# Patient Record
Sex: FEMALE | Race: OTHER
[De-identification: names, ages, dates, MRNs, and addresses within clinical notes are randomized per-mention and may not be internally consistent; named-entity substitution may affect disease eponyms.]

---

## 2020-11-25 ENCOUNTER — HOSPITAL ENCOUNTER (INPATIENT)
Dept: HOSPITAL 15 - ER | Age: 65
LOS: 3 days | Discharge: HOME | DRG: 177 | End: 2020-11-28
Attending: NURSE PRACTITIONER | Admitting: NURSE PRACTITIONER
Payer: COMMERCIAL

## 2020-11-25 VITALS — WEIGHT: 165.13 LBS | BODY MASS INDEX: 32.42 KG/M2 | HEIGHT: 60 IN

## 2020-11-25 VITALS — DIASTOLIC BLOOD PRESSURE: 75 MMHG | SYSTOLIC BLOOD PRESSURE: 122 MMHG

## 2020-11-25 VITALS — SYSTOLIC BLOOD PRESSURE: 122 MMHG | DIASTOLIC BLOOD PRESSURE: 75 MMHG

## 2020-11-25 DIAGNOSIS — I10: ICD-10-CM

## 2020-11-25 DIAGNOSIS — Z79.51: ICD-10-CM

## 2020-11-25 DIAGNOSIS — E66.9: ICD-10-CM

## 2020-11-25 DIAGNOSIS — Z83.3: ICD-10-CM

## 2020-11-25 DIAGNOSIS — E87.6: ICD-10-CM

## 2020-11-25 DIAGNOSIS — Z82.49: ICD-10-CM

## 2020-11-25 DIAGNOSIS — D72.829: ICD-10-CM

## 2020-11-25 DIAGNOSIS — E78.5: ICD-10-CM

## 2020-11-25 DIAGNOSIS — U07.1: Primary | ICD-10-CM

## 2020-11-25 DIAGNOSIS — J12.89: ICD-10-CM

## 2020-11-25 DIAGNOSIS — I31.3: ICD-10-CM

## 2020-11-25 LAB
ALBUMIN SERPL-MCNC: 3.5 G/DL (ref 3.4–5)
ALP SERPL-CCNC: 92 U/L (ref 45–117)
ALT SERPL-CCNC: 36 U/L (ref 13–56)
ANION GAP SERPL CALCULATED.3IONS-SCNC: 9 MMOL/L (ref 5–15)
BILIRUB SERPL-MCNC: 0.6 MG/DL (ref 0.2–1)
BUN SERPL-MCNC: 20 MG/DL (ref 7–18)
BUN/CREAT SERPL: 30.3
CALCIUM SERPL-MCNC: 8.2 MG/DL (ref 8.5–10.1)
CHLORIDE SERPL-SCNC: 101 MMOL/L (ref 98–107)
CO2 SERPL-SCNC: 24 MMOL/L (ref 21–32)
GLUCOSE SERPL-MCNC: 104 MG/DL (ref 74–106)
HCT VFR BLD AUTO: 40.8 % (ref 36–46)
HGB BLD-MCNC: 13.7 G/DL (ref 12.2–16.2)
MAGNESIUM SERPL-MCNC: 2.5 MG/DL (ref 1.6–2.6)
MCH RBC QN AUTO: 29.8 PG (ref 28–32)
MCV RBC AUTO: 88.9 FL (ref 80–100)
NRBC BLD QL AUTO: 0.1 %
POTASSIUM SERPL-SCNC: 3.2 MMOL/L (ref 3.5–5.1)
PROT SERPL-MCNC: 8 G/DL (ref 6.4–8.2)
SODIUM SERPL-SCNC: 134 MMOL/L (ref 136–145)

## 2020-11-25 PROCEDURE — 86141 C-REACTIVE PROTEIN HS: CPT

## 2020-11-25 PROCEDURE — 96365 THER/PROPH/DIAG IV INF INIT: CPT

## 2020-11-25 PROCEDURE — 96372 THER/PROPH/DIAG INJ SC/IM: CPT

## 2020-11-25 PROCEDURE — 84443 ASSAY THYROID STIM HORMONE: CPT

## 2020-11-25 PROCEDURE — 83735 ASSAY OF MAGNESIUM: CPT

## 2020-11-25 PROCEDURE — 74176 CT ABD & PELVIS W/O CONTRAST: CPT

## 2020-11-25 PROCEDURE — 36415 COLL VENOUS BLD VENIPUNCTURE: CPT

## 2020-11-25 PROCEDURE — 83690 ASSAY OF LIPASE: CPT

## 2020-11-25 PROCEDURE — 87426 SARSCOV CORONAVIRUS AG IA: CPT

## 2020-11-25 PROCEDURE — 84484 ASSAY OF TROPONIN QUANT: CPT

## 2020-11-25 PROCEDURE — 85379 FIBRIN DEGRADATION QUANT: CPT

## 2020-11-25 PROCEDURE — 81001 URINALYSIS AUTO W/SCOPE: CPT

## 2020-11-25 PROCEDURE — 87040 BLOOD CULTURE FOR BACTERIA: CPT

## 2020-11-25 PROCEDURE — 82728 ASSAY OF FERRITIN: CPT

## 2020-11-25 PROCEDURE — 80048 BASIC METABOLIC PNL TOTAL CA: CPT

## 2020-11-25 PROCEDURE — 83615 LACTATE (LD) (LDH) ENZYME: CPT

## 2020-11-25 PROCEDURE — 80053 COMPREHEN METABOLIC PANEL: CPT

## 2020-11-25 PROCEDURE — 94640 AIRWAY INHALATION TREATMENT: CPT

## 2020-11-25 PROCEDURE — 96366 THER/PROPH/DIAG IV INF ADDON: CPT

## 2020-11-25 PROCEDURE — 85025 COMPLETE CBC W/AUTO DIFF WBC: CPT

## 2020-11-25 PROCEDURE — 82805 BLOOD GASES W/O2 SATURATION: CPT

## 2020-11-25 PROCEDURE — 36600 WITHDRAWAL OF ARTERIAL BLOOD: CPT

## 2020-11-25 PROCEDURE — 71045 X-RAY EXAM CHEST 1 VIEW: CPT

## 2020-11-25 RX ADMIN — ALBUTEROL SULFATE SCH MCG: 108 AEROSOL, METERED RESPIRATORY (INHALATION) at 22:45

## 2020-11-25 RX ADMIN — BUDESONIDE SCH MCG: 180 AEROSOL, POWDER RESPIRATORY (INHALATION) at 22:48

## 2020-11-25 RX ADMIN — DOXYCYCLINE SCH MLS/HR: 100 INJECTION, POWDER, LYOPHILIZED, FOR SOLUTION INTRAVENOUS at 22:40

## 2020-11-25 NOTE — NUR
IV insertion

IV access obtained, via clean sterile technique by inserting 22 gauge catheter at left wrist 
after 2 attempt(s). IV secured properly. No trauma to site. Patient tolerated procedure 
well.

## 2020-11-25 NOTE — NUR
IV removal - left hand infiltrated and pain noted at site.

IV DC'd with sterile technique, catheter fully intact. Pressure dressing applied to site. 
Patient tolerated procedure well.

## 2020-11-25 NOTE — NUR
Respiratory note:

PT RECIEVED ON RA. PT AWAKE AND ALERT. NO RESP DISTRESS NOTED. SPO2 95%, HR 84, RR 20. BS 
CLEAR/DIMINISHED T/O. NO PRN TX INDICATED AT THIS TIME. PT AWARE TO CALL IF SOB.

-------------------------------------------------------------------------------

Addendum: 11/26/20 at 0128 by GHASSAN ROJAS, RT RT

-------------------------------------------------------------------------------

TX NOT GIVEN, COVID TEST STILL PENDING.

## 2020-11-25 NOTE — NUR
OPENING SHIFT NOTE

Patient Brought to unit from ER

Patient is AOx4 and has no signs of distress or SOB. Patient HOB at 30 degrees and bed 
locked in lowest position. Bed alarm on for safety. POC discussed with patient and patient 
verbally agreed to understanding. Will continue to monitor.

## 2020-11-26 VITALS — SYSTOLIC BLOOD PRESSURE: 116 MMHG | DIASTOLIC BLOOD PRESSURE: 61 MMHG

## 2020-11-26 VITALS — SYSTOLIC BLOOD PRESSURE: 111 MMHG | DIASTOLIC BLOOD PRESSURE: 66 MMHG

## 2020-11-26 VITALS — SYSTOLIC BLOOD PRESSURE: 94 MMHG | DIASTOLIC BLOOD PRESSURE: 61 MMHG

## 2020-11-26 LAB
ALBUMIN SERPL-MCNC: 2.8 G/DL (ref 3.4–5)
ALP SERPL-CCNC: 76 U/L (ref 45–117)
ALT SERPL-CCNC: 34 U/L (ref 13–56)
ANION GAP SERPL CALCULATED.3IONS-SCNC: 6 MMOL/L (ref 5–15)
BILIRUB SERPL-MCNC: 0.5 MG/DL (ref 0.2–1)
BUN SERPL-MCNC: 11 MG/DL (ref 7–18)
BUN/CREAT SERPL: 23.4
CALCIUM SERPL-MCNC: 7.7 MG/DL (ref 8.5–10.1)
CHLORIDE SERPL-SCNC: 108 MMOL/L (ref 98–107)
CO2 SERPL-SCNC: 24 MMOL/L (ref 21–32)
GLUCOSE SERPL-MCNC: 109 MG/DL (ref 74–106)
HCT VFR BLD AUTO: 35.7 % (ref 36–46)
HGB BLD-MCNC: 12.1 G/DL (ref 12.2–16.2)
MCH RBC QN AUTO: 29.8 PG (ref 28–32)
MCV RBC AUTO: 88.3 FL (ref 80–100)
NRBC BLD QL AUTO: 0 %
POTASSIUM SERPL-SCNC: 3.2 MMOL/L (ref 3.5–5.1)
PROT SERPL-MCNC: 6.7 G/DL (ref 6.4–8.2)
SODIUM SERPL-SCNC: 138 MMOL/L (ref 136–145)

## 2020-11-26 RX ADMIN — DOXYCYCLINE SCH MLS/HR: 100 INJECTION, POWDER, LYOPHILIZED, FOR SOLUTION INTRAVENOUS at 09:36

## 2020-11-26 RX ADMIN — BUDESONIDE SCH MCG: 180 AEROSOL, POWDER RESPIRATORY (INHALATION) at 06:49

## 2020-11-26 RX ADMIN — ALBUTEROL SULFATE SCH MCG: 108 AEROSOL, METERED RESPIRATORY (INHALATION) at 06:00

## 2020-11-26 RX ADMIN — ALBUTEROL SULFATE SCH MCG: 108 AEROSOL, METERED RESPIRATORY (INHALATION) at 13:51

## 2020-11-26 RX ADMIN — DOXYCYCLINE SCH MLS/HR: 100 INJECTION, POWDER, LYOPHILIZED, FOR SOLUTION INTRAVENOUS at 21:31

## 2020-11-26 RX ADMIN — Medication SCH MG: at 09:36

## 2020-11-26 RX ADMIN — ALBUTEROL SULFATE SCH MCG: 108 AEROSOL, METERED RESPIRATORY (INHALATION) at 06:49

## 2020-11-26 RX ADMIN — BUDESONIDE SCH MCG: 180 AEROSOL, POWDER RESPIRATORY (INHALATION) at 21:37

## 2020-11-26 RX ADMIN — ENOXAPARIN SODIUM SCH MG: 40 INJECTION SUBCUTANEOUS at 09:37

## 2020-11-26 RX ADMIN — ZINC SULFATE CAP 220 MG (50 MG ELEMENTAL ZN) SCH MG: 220 (50 ZN) CAP at 09:36

## 2020-11-26 RX ADMIN — Medication SCH UNIT: at 09:36

## 2020-11-26 RX ADMIN — ALBUTEROL SULFATE SCH MCG: 108 AEROSOL, METERED RESPIRATORY (INHALATION) at 21:37

## 2020-11-26 NOTE — NUR
opening shift note



Assumed care patient in bed AOx4. No s/s of distress noted at this time, patient denies 
pain. Update given regarding POC and all questions and concerns addressed. Bed in lowest, 
locked position, call light within reach. Will continue care.

## 2020-11-26 NOTE — NUR
OPENING SHIFT NOTE



Assumed care of patient who is alert and oriented, currently on RA with no S/S of distress 
or SOB noted at this time. Patient denies any pain. Full linen changed due to patients 
coffee spilt. Patient is ambulatory without assistance, encouraged to call for assistance as 
needed. POC discussed with patient, all questions answered, patient verbalized 
understanding. Bed is locked, im lowest position, side rails up x2. Call light within reach, 
will continue to monitor PRN/Q1hr.

## 2020-11-26 NOTE — NUR
Spoke with family



Received call from patient's niece Jordyn. Password verified and update on patient 
condition provided at this time.

## 2020-11-27 VITALS — SYSTOLIC BLOOD PRESSURE: 126 MMHG | DIASTOLIC BLOOD PRESSURE: 88 MMHG

## 2020-11-27 VITALS — DIASTOLIC BLOOD PRESSURE: 81 MMHG | SYSTOLIC BLOOD PRESSURE: 134 MMHG

## 2020-11-27 VITALS — DIASTOLIC BLOOD PRESSURE: 86 MMHG | SYSTOLIC BLOOD PRESSURE: 138 MMHG

## 2020-11-27 VITALS — DIASTOLIC BLOOD PRESSURE: 81 MMHG | SYSTOLIC BLOOD PRESSURE: 124 MMHG

## 2020-11-27 LAB
ANION GAP SERPL CALCULATED.3IONS-SCNC: 7 MMOL/L (ref 5–15)
BUN SERPL-MCNC: 14 MG/DL (ref 7–18)
BUN/CREAT SERPL: 25.9
CALCIUM SERPL-MCNC: 8.7 MG/DL (ref 8.5–10.1)
CHLORIDE SERPL-SCNC: 112 MMOL/L (ref 98–107)
CO2 SERPL-SCNC: 23 MMOL/L (ref 21–32)
GLUCOSE SERPL-MCNC: 109 MG/DL (ref 74–106)
POTASSIUM SERPL-SCNC: 3.8 MMOL/L (ref 3.5–5.1)
SODIUM SERPL-SCNC: 142 MMOL/L (ref 136–145)

## 2020-11-27 RX ADMIN — Medication SCH MG: at 09:53

## 2020-11-27 RX ADMIN — DOXYCYCLINE SCH MLS/HR: 100 INJECTION, POWDER, LYOPHILIZED, FOR SOLUTION INTRAVENOUS at 09:53

## 2020-11-27 RX ADMIN — ZINC SULFATE CAP 220 MG (50 MG ELEMENTAL ZN) SCH MG: 220 (50 ZN) CAP at 09:54

## 2020-11-27 RX ADMIN — ALBUTEROL SULFATE SCH MCG: 108 AEROSOL, METERED RESPIRATORY (INHALATION) at 13:21

## 2020-11-27 RX ADMIN — BUDESONIDE SCH MCG: 180 AEROSOL, POWDER RESPIRATORY (INHALATION) at 06:10

## 2020-11-27 RX ADMIN — ALBUTEROL SULFATE SCH MCG: 108 AEROSOL, METERED RESPIRATORY (INHALATION) at 06:10

## 2020-11-27 RX ADMIN — ENOXAPARIN SODIUM SCH MG: 40 INJECTION SUBCUTANEOUS at 09:53

## 2020-11-27 RX ADMIN — Medication SCH UNIT: at 09:53

## 2020-11-27 RX ADMIN — BUDESONIDE SCH MCG: 180 AEROSOL, POWDER RESPIRATORY (INHALATION) at 21:58

## 2020-11-27 RX ADMIN — ALBUTEROL SULFATE SCH MCG: 108 AEROSOL, METERED RESPIRATORY (INHALATION) at 21:58

## 2020-11-27 RX ADMIN — DOXYCYCLINE SCH MLS/HR: 100 INJECTION, POWDER, LYOPHILIZED, FOR SOLUTION INTRAVENOUS at 21:32

## 2020-11-27 NOTE — NUR
OPENING SHIFT NOTE



Assumed care of patient who is alert and oriented, currently on RA with no S/S of distress 
or SOB noted at this time. Patient denies any pain. Patient is ambulatory without 
assistance, encouraged to call for assistance as needed. POC discussed with patient, all 
questions answered, patient verbalized understanding. Bed is locked, im lowest position, 
side rails up x2. Call light within reach, will continue to monitor PRN/Q1hr

## 2020-11-27 NOTE — NUR
Patient  care endorsed 

endorsed care to Danyel rn, pt sitting up in bed eating dinner now. Patient proning throughout 
the day as tolerated and doing IS as ordered. Pt on room air. No acute distress or sob 
noted. Call light within reach.

## 2020-11-27 NOTE — NUR
IV REMOVAL



IV DC'd with clean sterile technique, catheter fully intact. Pressure dressing applied to 
site. Patient tolerated well.

## 2020-11-27 NOTE — NUR
IV INSERTION



IV access obtained, via clean sterile technique by inserting a 22 gauge catheter at left 
forearm after 1 attempt. IV secured properly. No trauma to site. Patient tolerated well.

## 2020-11-28 VITALS — SYSTOLIC BLOOD PRESSURE: 114 MMHG | DIASTOLIC BLOOD PRESSURE: 72 MMHG

## 2020-11-28 VITALS — DIASTOLIC BLOOD PRESSURE: 81 MMHG | SYSTOLIC BLOOD PRESSURE: 139 MMHG

## 2020-11-28 VITALS — SYSTOLIC BLOOD PRESSURE: 123 MMHG | DIASTOLIC BLOOD PRESSURE: 77 MMHG

## 2020-11-28 VITALS — DIASTOLIC BLOOD PRESSURE: 78 MMHG | SYSTOLIC BLOOD PRESSURE: 136 MMHG

## 2020-11-28 RX ADMIN — Medication SCH UNIT: at 09:50

## 2020-11-28 RX ADMIN — ENOXAPARIN SODIUM SCH MG: 40 INJECTION SUBCUTANEOUS at 09:50

## 2020-11-28 RX ADMIN — DOXYCYCLINE SCH MLS/HR: 100 INJECTION, POWDER, LYOPHILIZED, FOR SOLUTION INTRAVENOUS at 09:50

## 2020-11-28 RX ADMIN — ALBUTEROL SULFATE SCH MCG: 108 AEROSOL, METERED RESPIRATORY (INHALATION) at 06:06

## 2020-11-28 RX ADMIN — ZINC SULFATE CAP 220 MG (50 MG ELEMENTAL ZN) SCH MG: 220 (50 ZN) CAP at 09:50

## 2020-11-28 RX ADMIN — Medication SCH MG: at 09:50

## 2020-11-28 RX ADMIN — BUDESONIDE SCH MCG: 180 AEROSOL, POWDER RESPIRATORY (INHALATION) at 06:06

## 2020-11-28 RX ADMIN — ALBUTEROL SULFATE SCH MCG: 108 AEROSOL, METERED RESPIRATORY (INHALATION) at 13:23

## 2020-11-28 NOTE — NUR
Discharge instructions given as ordered. Encourage to follow up with PMD as instructed. All 
questions and concerns addressed. Patient verbalized understanding.  Medication 
reconciliation form completed and copy given to patient. New prescription given to patient 
and educated on use, side effects, signs and symptoms and contraindications. IV removed with 
catheter intact, pressure dressing applied.  Telemetry unit returned to ICU. Patient taken 
to vehicle via wheelchair with all personal belongings, accompanied by staff and patient's 
niece Jordyn picked her up. No distress noted at time of departure or sob.

## 2020-11-28 NOTE — NUR
Nutrition Assessment Notes



Please refer to link for full assessment notes.



Est Energy needs: [id number] kcals (17-20 kcal/kgBW)

Est Protein needs: 75-83 gms/day (1.0-1.1 gm/kgBW)



Will continue to monitor and reassess prn.

-------------------------------------------------------------------------------

Addendum: 11/28/20 at 1238 by Brittny Barreto RD

-------------------------------------------------------------------------------

Amended: Links added.

## 2021-03-29 NOTE — NUR
Respiratory note:

MEDICATION NOT AVAILABLE AT THIS TIME. PHARMACY NOTIFIED. NO DISTRESS NOTED. HR 62, RR 18, 
POX 94% ON RA. Detail Level: Detailed

## 2023-02-11 ENCOUNTER — APPOINTMENT (OUTPATIENT)
Dept: RADIOLOGY | Facility: MEDICAL CENTER | Age: 68
DRG: 813 | End: 2023-02-11
Attending: EMERGENCY MEDICINE
Payer: COMMERCIAL

## 2023-02-11 ENCOUNTER — HOSPITAL ENCOUNTER (INPATIENT)
Facility: MEDICAL CENTER | Age: 68
LOS: 6 days | DRG: 813 | End: 2023-02-17
Attending: EMERGENCY MEDICINE | Admitting: STUDENT IN AN ORGANIZED HEALTH CARE EDUCATION/TRAINING PROGRAM
Payer: COMMERCIAL

## 2023-02-11 DIAGNOSIS — D69.6 SEVERE THROMBOCYTOPENIA (HCC): ICD-10-CM

## 2023-02-11 DIAGNOSIS — T14.8XXA BRUISING: ICD-10-CM

## 2023-02-11 DIAGNOSIS — R51.9 ACUTE NONINTRACTABLE HEADACHE, UNSPECIFIED HEADACHE TYPE: ICD-10-CM

## 2023-02-11 DIAGNOSIS — R04.0 EPISTAXIS: ICD-10-CM

## 2023-02-11 DIAGNOSIS — D69.6 THROMBOCYTOPENIA (HCC): ICD-10-CM

## 2023-02-11 PROBLEM — E87.6 HYPOKALEMIA: Status: ACTIVE | Noted: 2023-02-11

## 2023-02-11 PROBLEM — I10 HYPERTENSION: Status: ACTIVE | Noted: 2023-02-11

## 2023-02-11 PROBLEM — E78.5 HYPERLIPIDEMIA: Status: ACTIVE | Noted: 2023-02-11

## 2023-02-11 LAB
ABO + RH BLD: NORMAL
ABO GROUP BLD: ABNORMAL
ALBUMIN SERPL BCP-MCNC: 3.8 G/DL (ref 3.2–4.9)
ALBUMIN/GLOB SERPL: 1.1 G/DL
ALP SERPL-CCNC: 126 U/L (ref 30–99)
ALT SERPL-CCNC: 72 U/L (ref 2–50)
ANION GAP SERPL CALC-SCNC: 10 MMOL/L (ref 7–16)
ANISOCYTOSIS BLD QL SMEAR: ABNORMAL
APTT PPP: 27 SEC (ref 24.7–36)
AST SERPL-CCNC: 39 U/L (ref 12–45)
BARCODED ABORH UBTYP: 6200
BARCODED PRD CODE UBPRD: NORMAL
BARCODED UNIT NUM UBUNT: NORMAL
BASOPHILS # BLD AUTO: 0.4 % (ref 0–1.8)
BASOPHILS # BLD: 0.03 K/UL (ref 0–0.12)
BILIRUB SERPL-MCNC: 0.4 MG/DL (ref 0.1–1.5)
BLD GP AB INVEST PLASRBC-IMP: ABNORMAL
BLD GP AB INVEST PLASRBC-IMP: ABNORMAL
BLD GP AB SCN SERPL QL: ABNORMAL
BUN SERPL-MCNC: 11 MG/DL (ref 8–22)
CALCIUM ALBUM COR SERPL-MCNC: 9.5 MG/DL (ref 8.5–10.5)
CALCIUM SERPL-MCNC: 9.3 MG/DL (ref 8.5–10.5)
CHLORIDE SERPL-SCNC: 104 MMOL/L (ref 96–112)
CO2 SERPL-SCNC: 26 MMOL/L (ref 20–33)
COMMENT 1642: NORMAL
COMPONENT P 8504P: NORMAL
CREAT SERPL-MCNC: 0.42 MG/DL (ref 0.5–1.4)
DAT C3D-SP REAG RBC QL: ABNORMAL
DAT IGG-SP REAG RBC QL: ABNORMAL
EOSINOPHIL # BLD AUTO: 0.03 K/UL (ref 0–0.51)
EOSINOPHIL NFR BLD: 0.4 % (ref 0–6.9)
ERYTHROCYTE [DISTWIDTH] IN BLOOD BY AUTOMATED COUNT: 47.4 FL (ref 35.9–50)
GFR SERPLBLD CREATININE-BSD FMLA CKD-EPI: 107 ML/MIN/1.73 M 2
GLOBULIN SER CALC-MCNC: 3.6 G/DL (ref 1.9–3.5)
GLUCOSE SERPL-MCNC: 108 MG/DL (ref 65–99)
HAV IGM SERPL QL IA: NORMAL
HBV CORE IGM SER QL: NORMAL
HBV SURFACE AG SER QL: NORMAL
HCT VFR BLD AUTO: 34.5 % (ref 37–47)
HCV AB SER QL: NORMAL
HGB BLD-MCNC: 11.8 G/DL (ref 12–16)
HIV 1+2 AB+HIV1 P24 AG SERPL QL IA: NORMAL
IMM GRANULOCYTES # BLD AUTO: 0.03 K/UL (ref 0–0.11)
IMM GRANULOCYTES NFR BLD AUTO: 0.4 % (ref 0–0.9)
INR PPP: 0.99 (ref 0.87–1.13)
LDH SERPL L TO P-CCNC: 238 U/L (ref 107–266)
LYMPHOCYTES # BLD AUTO: 1.67 K/UL (ref 1–4.8)
LYMPHOCYTES NFR BLD: 20.6 % (ref 22–41)
MACROCYTES BLD QL SMEAR: ABNORMAL
MCH RBC QN AUTO: 30.6 PG (ref 27–33)
MCHC RBC AUTO-ENTMCNC: 34.2 G/DL (ref 33.6–35)
MCV RBC AUTO: 89.6 FL (ref 81.4–97.8)
MICROCYTES BLD QL SMEAR: ABNORMAL
MONOCYTES # BLD AUTO: 0.73 K/UL (ref 0–0.85)
MONOCYTES NFR BLD AUTO: 9 % (ref 0–13.4)
MORPHOLOGY BLD-IMP: NORMAL
NEUTROPHILS # BLD AUTO: 5.62 K/UL (ref 2–7.15)
NEUTROPHILS NFR BLD: 69.2 % (ref 44–72)
NRBC # BLD AUTO: 0 K/UL
NRBC BLD-RTO: 0 /100 WBC
PLATELET # BLD AUTO: 11 K/UL (ref 164–446)
PLATELET BLD QL SMEAR: NORMAL
PLATELETS.RETICULATED NFR BLD AUTO: 33.8 K/UL (ref 0.6–13.1)
POTASSIUM SERPL-SCNC: 3.1 MMOL/L (ref 3.6–5.5)
PRODUCT TYPE UPROD: NORMAL
PROT SERPL-MCNC: 7.4 G/DL (ref 6–8.2)
PROTHROMBIN TIME: 13 SEC (ref 12–14.6)
RBC # BLD AUTO: 3.85 M/UL (ref 4.2–5.4)
RBC BLD AUTO: PRESENT
RH BLD: ABNORMAL
SODIUM SERPL-SCNC: 140 MMOL/L (ref 135–145)
UNIT STATUS USTAT: NORMAL
VIT B12 SERPL-MCNC: 565 PG/ML (ref 211–911)
WBC # BLD AUTO: 8.1 K/UL (ref 4.8–10.8)

## 2023-02-11 PROCEDURE — 30233R1 TRANSFUSION OF NONAUTOLOGOUS PLATELETS INTO PERIPHERAL VEIN, PERCUTANEOUS APPROACH: ICD-10-PCS

## 2023-02-11 PROCEDURE — 85730 THROMBOPLASTIN TIME PARTIAL: CPT

## 2023-02-11 PROCEDURE — 86870 RBC ANTIBODY IDENTIFICATION: CPT

## 2023-02-11 PROCEDURE — 99223 1ST HOSP IP/OBS HIGH 75: CPT | Mod: GC | Performed by: STUDENT IN AN ORGANIZED HEALTH CARE EDUCATION/TRAINING PROGRAM

## 2023-02-11 PROCEDURE — 80053 COMPREHEN METABOLIC PANEL: CPT

## 2023-02-11 PROCEDURE — 770020 HCHG ROOM/CARE - TELE (206)

## 2023-02-11 PROCEDURE — P9034 PLATELETS, PHERESIS: HCPCS

## 2023-02-11 PROCEDURE — 85025 COMPLETE CBC W/AUTO DIFF WBC: CPT

## 2023-02-11 PROCEDURE — 86901 BLOOD TYPING SEROLOGIC RH(D): CPT

## 2023-02-11 PROCEDURE — 36415 COLL VENOUS BLD VENIPUNCTURE: CPT

## 2023-02-11 PROCEDURE — A9270 NON-COVERED ITEM OR SERVICE: HCPCS

## 2023-02-11 PROCEDURE — 700102 HCHG RX REV CODE 250 W/ 637 OVERRIDE(OP)

## 2023-02-11 PROCEDURE — 700102 HCHG RX REV CODE 250 W/ 637 OVERRIDE(OP): Performed by: STUDENT IN AN ORGANIZED HEALTH CARE EDUCATION/TRAINING PROGRAM

## 2023-02-11 PROCEDURE — 86880 COOMBS TEST DIRECT: CPT

## 2023-02-11 PROCEDURE — 82607 VITAMIN B-12: CPT

## 2023-02-11 PROCEDURE — 85055 RETICULATED PLATELET ASSAY: CPT

## 2023-02-11 PROCEDURE — 83615 LACTATE (LD) (LDH) ENZYME: CPT

## 2023-02-11 PROCEDURE — 700111 HCHG RX REV CODE 636 W/ 250 OVERRIDE (IP): Mod: JG

## 2023-02-11 PROCEDURE — 86850 RBC ANTIBODY SCREEN: CPT

## 2023-02-11 PROCEDURE — 70450 CT HEAD/BRAIN W/O DYE: CPT

## 2023-02-11 PROCEDURE — 86900 BLOOD TYPING SEROLOGIC ABO: CPT

## 2023-02-11 PROCEDURE — A9270 NON-COVERED ITEM OR SERVICE: HCPCS | Performed by: STUDENT IN AN ORGANIZED HEALTH CARE EDUCATION/TRAINING PROGRAM

## 2023-02-11 PROCEDURE — 80074 ACUTE HEPATITIS PANEL: CPT

## 2023-02-11 PROCEDURE — 85610 PROTHROMBIN TIME: CPT

## 2023-02-11 PROCEDURE — 86038 ANTINUCLEAR ANTIBODIES: CPT

## 2023-02-11 PROCEDURE — 86905 BLOOD TYPING RBC ANTIGENS: CPT | Mod: 91

## 2023-02-11 PROCEDURE — 99285 EMERGENCY DEPT VISIT HI MDM: CPT

## 2023-02-11 PROCEDURE — 36430 TRANSFUSION BLD/BLD COMPNT: CPT

## 2023-02-11 PROCEDURE — 87389 HIV-1 AG W/HIV-1&-2 AB AG IA: CPT

## 2023-02-11 RX ORDER — POTASSIUM CHLORIDE 1500 MG/1
40 TABLET, EXTENDED RELEASE ORAL ONCE
Status: COMPLETED | OUTPATIENT
Start: 2023-02-11 | End: 2023-02-11

## 2023-02-11 RX ORDER — POLYETHYLENE GLYCOL 3350 17 G/17G
1 POWDER, FOR SOLUTION ORAL
Status: DISCONTINUED | OUTPATIENT
Start: 2023-02-11 | End: 2023-02-12

## 2023-02-11 RX ORDER — HYDROCHLOROTHIAZIDE 25 MG/1
25 TABLET ORAL DAILY
Status: ON HOLD | COMMUNITY
End: 2023-02-17

## 2023-02-11 RX ORDER — BISACODYL 10 MG
10 SUPPOSITORY, RECTAL RECTAL
Status: DISCONTINUED | OUTPATIENT
Start: 2023-02-11 | End: 2023-02-12

## 2023-02-11 RX ORDER — ATORVASTATIN CALCIUM 20 MG/1
20 TABLET, FILM COATED ORAL NIGHTLY
COMMUNITY

## 2023-02-11 RX ORDER — AMOXICILLIN 250 MG
2 CAPSULE ORAL 2 TIMES DAILY
Status: DISCONTINUED | OUTPATIENT
Start: 2023-02-11 | End: 2023-02-12

## 2023-02-11 RX ADMIN — SENNOSIDES AND DOCUSATE SODIUM 2 TABLET: 50; 8.6 TABLET ORAL at 21:57

## 2023-02-11 RX ADMIN — POTASSIUM CHLORIDE 40 MEQ: 1500 TABLET, EXTENDED RELEASE ORAL at 23:49

## 2023-02-11 RX ADMIN — IMMUNE GLOBULIN INFUSION (HUMAN) 45 G: 100 INJECTION, SOLUTION INTRAVENOUS; SUBCUTANEOUS at 21:51

## 2023-02-11 ASSESSMENT — ENCOUNTER SYMPTOMS
BLURRED VISION: 0
NAUSEA: 0
WHEEZING: 0
VOMITING: 0
CHILLS: 0
FEVER: 0
SHORTNESS OF BREATH: 0
PALPITATIONS: 0
DOUBLE VISION: 0
HEADACHES: 1

## 2023-02-11 ASSESSMENT — PATIENT HEALTH QUESTIONNAIRE - PHQ9
8. MOVING OR SPEAKING SO SLOWLY THAT OTHER PEOPLE COULD HAVE NOTICED. OR THE OPPOSITE, BEING SO FIGETY OR RESTLESS THAT YOU HAVE BEEN MOVING AROUND A LOT MORE THAN USUAL: NOT AT ALL
4. FEELING TIRED OR HAVING LITTLE ENERGY: NOT AT ALL
6. FEELING BAD ABOUT YOURSELF - OR THAT YOU ARE A FAILURE OR HAVE LET YOURSELF OR YOUR FAMILY DOWN: SEVERAL DAYS
2. FEELING DOWN, DEPRESSED, IRRITABLE, OR HOPELESS: MORE THAN HALF THE DAYS
5. POOR APPETITE OR OVEREATING: NOT AT ALL
1. LITTLE INTEREST OR PLEASURE IN DOING THINGS: MORE THAN HALF THE DAYS
9. THOUGHTS THAT YOU WOULD BE BETTER OFF DEAD, OR OF HURTING YOURSELF: SEVERAL DAYS
SUM OF ALL RESPONSES TO PHQ QUESTIONS 1-9: 7
7. TROUBLE CONCENTRATING ON THINGS, SUCH AS READING THE NEWSPAPER OR WATCHING TELEVISION: NOT AT ALL
SUM OF ALL RESPONSES TO PHQ9 QUESTIONS 1 AND 2: 4
3. TROUBLE FALLING OR STAYING ASLEEP OR SLEEPING TOO MUCH: SEVERAL DAYS

## 2023-02-11 ASSESSMENT — FIBROSIS 4 INDEX: FIB4 SCORE: 28

## 2023-02-11 ASSESSMENT — LIFESTYLE VARIABLES
EVER HAD A DRINK FIRST THING IN THE MORNING TO STEADY YOUR NERVES TO GET RID OF A HANGOVER: NO
CONSUMPTION TOTAL: NEGATIVE
EVER FELT BAD OR GUILTY ABOUT YOUR DRINKING: NO
HAVE PEOPLE ANNOYED YOU BY CRITICIZING YOUR DRINKING: NO
TOTAL SCORE: 0
HAVE YOU EVER FELT YOU SHOULD CUT DOWN ON YOUR DRINKING: NO
ALCOHOL_USE: NO
AVERAGE NUMBER OF DAYS PER WEEK YOU HAVE A DRINK CONTAINING ALCOHOL: 0
HOW MANY TIMES IN THE PAST YEAR HAVE YOU HAD 5 OR MORE DRINKS IN A DAY: 0
ON A TYPICAL DAY WHEN YOU DRINK ALCOHOL HOW MANY DRINKS DO YOU HAVE: 0

## 2023-02-11 ASSESSMENT — PAIN DESCRIPTION - PAIN TYPE
TYPE: ACUTE PAIN
TYPE: ACUTE PAIN

## 2023-02-11 NOTE — ED TRIAGE NOTES
"Chief Complaint   Patient presents with    Abnormal Labs     Sent for low platelets \"12\"     Pt diagnosed with ITP at [facility]. Reports that she normally goes to [facility], but no longer wants to go there so she came here today.  BP (!) 131/94   Pulse (!) 101   Temp 36.6 °C (97.8 °F) (Temporal)   Resp 16   Wt 76.1 kg (167 lb 12.3 oz)   SpO2 96%   Pt informed of wait times. Educated on triage process.  Asked to return to triage RN for any new or worsening of symptoms. Thanked for patience.      "

## 2023-02-11 NOTE — ED PROVIDER NOTES
"ED Provider Note    CHIEF COMPLAINT  Chief Complaint   Patient presents with    Abnormal Labs     Sent for low platelets \"12\"       EXTERNAL RECORDS REVIEWED  No external records for review available.    HPI/ROS  LIMITATION TO HISTORY   Select: : None  OUTSIDE HISTORIAN(S):  Family spouse at bedside provides additional history.    Virgen Boone is a 67 y.o. female who presents to the emergency department for evaluation of low platelets.  The patient has a history of thrombocytopenia.  She was recently in [facility] was records were not available.  Sounds like it was an idiopathic thrombocytopenia.  She was discharged when her platelets improved.  Follow-up she had low platelets and she was told to come to the hospital.    She has been bruising on her arms and in some places across her body.  She has some episodes of epistaxis.  She also is a mild headache that started yesterday.  No falls or trauma no other acute concerns or complaints of pain.  No blood thinners.  No other chronic medical problems.    PAST MEDICAL HISTORY   has a past medical history of Hypertension.    SURGICAL HISTORY  patient denies any surgical history    FAMILY HISTORY  History reviewed. No pertinent family history.    SOCIAL HISTORY  Social History     Tobacco Use    Smoking status: Never    Smokeless tobacco: Never   Vaping Use    Vaping Use: Never used   Substance and Sexual Activity    Alcohol use: Not Currently    Drug use: Not Currently    Sexual activity: Not on file       CURRENT MEDICATIONS  Home Medications       Reviewed by Haven Olguin R.N. (Registered Nurse) on 02/11/23 at 1511  Med List Status: Partial     Medication Last Dose Status        Patient Yuniel Taking any Medications                           ALLERGIES  No Known Allergies    PHYSICAL EXAM  VITAL SIGNS: BP (!) 131/94   Pulse (!) 101   Temp 36.6 °C (97.8 °F) (Temporal)   Resp 16   Wt 76.1 kg (167 lb 12.3 oz)   SpO2 96%    Constitutional: " Well developed, Well nourished, No acute distress, Non-toxic appearance.   HENT: Normocephalic, Atraumatic,  Eyes: PERRL, EOMI, Conjunctiva normal, No discharge.   Neck: Normal range of motion, No tenderness, Supple, No stridor.   Cardiovascular: Normal heart rate, Normal rhythm, No murmurs, No rubs, No gallops.   Thorax & Lungs: Normal breath sounds, No respiratory distress, No wheezing,  Abdomen: Bowel sounds normal, Soft, No tenderness  Skin: Warm, Dry, No erythema, No rash.  Bruises on her right upper extremity.  Musculoskeletal: Good range of motion in all major joints. No edema. No tenderness to palpation or major deformities noted.   Neurologic: Alert, No focal deficits noted.   Psychiatric: Affect normal      DIAGNOSTIC STUDIES / PROCEDURES    Results for orders placed or performed during the hospital encounter of 02/11/23   CBC WITH DIFFERENTIAL   Result Value Ref Range    WBC 8.1 4.8 - 10.8 K/uL    RBC 3.85 (L) 4.20 - 5.40 M/uL    Hemoglobin 11.8 (L) 12.0 - 16.0 g/dL    Hematocrit 34.5 (L) 37.0 - 47.0 %    MCV 89.6 81.4 - 97.8 fL    MCH 30.6 27.0 - 33.0 pg    MCHC 34.2 33.6 - 35.0 g/dL    RDW 47.4 35.9 - 50.0 fL    Platelet Count 11 (LL) 164 - 446 K/uL    Neutrophils-Polys 69.20 44.00 - 72.00 %    Lymphocytes 20.60 (L) 22.00 - 41.00 %    Monocytes 9.00 0.00 - 13.40 %    Eosinophils 0.40 0.00 - 6.90 %    Basophils 0.40 0.00 - 1.80 %    Immature Granulocytes 0.40 0.00 - 0.90 %    Nucleated RBC 0.00 /100 WBC    Neutrophils (Absolute) 5.62 2.00 - 7.15 K/uL    Lymphs (Absolute) 1.67 1.00 - 4.80 K/uL    Monos (Absolute) 0.73 0.00 - 0.85 K/uL    Eos (Absolute) 0.03 0.00 - 0.51 K/uL    Baso (Absolute) 0.03 0.00 - 0.12 K/uL    Immature Granulocytes (abs) 0.03 0.00 - 0.11 K/uL    NRBC (Absolute) 0.00 K/uL    Anisocytosis 1+     Macrocytosis 1+     Microcytosis 1+    COMP METABOLIC PANEL   Result Value Ref Range    Sodium 140 135 - 145 mmol/L    Potassium 3.1 (L) 3.6 - 5.5 mmol/L    Chloride 104 96 - 112 mmol/L     Co2 26 20 - 33 mmol/L    Anion Gap 10.0 7.0 - 16.0    Glucose 108 (H) 65 - 99 mg/dL    Bun 11 8 - 22 mg/dL    Creatinine 0.42 (L) 0.50 - 1.40 mg/dL    Calcium 9.3 8.5 - 10.5 mg/dL    AST(SGOT) 39 12 - 45 U/L    ALT(SGPT) 72 (H) 2 - 50 U/L    Alkaline Phosphatase 126 (H) 30 - 99 U/L    Total Bilirubin 0.4 0.1 - 1.5 mg/dL    Albumin 3.8 3.2 - 4.9 g/dL    Total Protein 7.4 6.0 - 8.2 g/dL    Globulin 3.6 (H) 1.9 - 3.5 g/dL    A-G Ratio 1.1 g/dL   APTT   Result Value Ref Range    APTT 27.0 24.7 - 36.0 sec   PROTHROMBIN TIME (INR)   Result Value Ref Range    PT 13.0 12.0 - 14.6 sec    INR 0.99 0.87 - 1.13   ESTIMATED GFR   Result Value Ref Range    GFR (CKD-EPI) 107 >60 mL/min/1.73 m 2   CORRECTED CALCIUM   Result Value Ref Range    Correct Calcium 9.5 8.5 - 10.5 mg/dL   PERIPHERAL SMEAR REVIEW   Result Value Ref Range    Peripheral Smear Review see below    PLATELET ESTIMATE   Result Value Ref Range    Plt Estimation Marked Decrease    MORPHOLOGY   Result Value Ref Range    RBC Morphology Present    IMMATURE PLT FRACTION   Result Value Ref Range    Imm. Plt Fraction 33.8 (H) 0.6 - 13.1 K/uL   DIFFERENTIAL COMMENT   Result Value Ref Range    Comments-Diff see below         RADIOLOGY  I have independently interpreted the diagnostic imaging associated with this visit and am waiting the final reading from the radiologist.   My preliminary interpretation is a follows: No intracranial hemorrhage  Radiologist interpretation:     CT-HEAD W/O   Final Result      Head CT without contrast within normal limits. No evidence of acute cerebral infarction, hemorrhage or mass lesion.               COURSE & MEDICAL DECISION MAKING    ED Observation Status?= No the patient will require hospitalization for acutely abnormal labs    INITIAL ASSESSMENT, COURSE AND PLAN  Care Narrative: The patient has ITP and she has epistaxis and bruising also has a mild headache.  Although she has ITP she was recently in the hospital and treated with IVIG  and steroids and other medications and a platelet transfusion.  Platelets trended up and she was discharged home and had a given decrease in her platelet count.    Have elected to repeat her labs her platelets are again 11,000.  I spoke with the hematologist    Dr lizama, and he recommended 1 g of IVIG Solu-Medrol 80 mg platelets and a head CT.  This will be initiated.    The patient has no other signs of life-threatening bleeding at this time.  She well require hospitalization for life-threatening low platelets.    I have [facility] and I have communicated this plan to them.    The patient quires hospice and further work-up and treatment.    Medications recommended by hematology again are IVIG 1 g Solu-Medrol 80 mg and 1 unit of platelets.  CT is pending.      Patient has acutely low platelets which are so low that this is acutely life-threatening for hemorrhage she required hospitalization and emergent treatment.  Although she has mild epistaxis she is not bleeding at this time she is not requiring intervention.      ADDITIONAL PROBLEM LIST  Thrombocytopenia      DISPOSITION AND DISCUSSIONS  I have discussed management of the patient with the following physicians and TEZ's: Hematology as above and [facility] Residents    Discussion of management with other QHP or appropriate source(s): None             FINAL DIAGNOSIS  1. Thrombocytopenia (HCC)    2. Epistaxis    3. Bruising    4. Acute nonintractable headache, unspecified headache type           Electronically signed by: Morgan Rivera M.D., 2/11/2023 3:56 PM

## 2023-02-12 ENCOUNTER — APPOINTMENT (OUTPATIENT)
Dept: RADIOLOGY | Facility: MEDICAL CENTER | Age: 68
DRG: 813 | End: 2023-02-12
Attending: INTERNAL MEDICINE
Payer: COMMERCIAL

## 2023-02-12 PROBLEM — D69.6 DISORDER INVOLVING THROMBOCYTOPENIA (HCC): Status: ACTIVE | Noted: 2023-01-22

## 2023-02-12 PROBLEM — R04.0 EPISTAXIS: Status: ACTIVE | Noted: 2023-01-22

## 2023-02-12 LAB
ALBUMIN SERPL BCP-MCNC: 3.6 G/DL (ref 3.2–4.9)
ALBUMIN/GLOB SERPL: 0.8 G/DL
ALP SERPL-CCNC: 113 U/L (ref 30–99)
ALT SERPL-CCNC: 66 U/L (ref 2–50)
ANION GAP SERPL CALC-SCNC: 10 MMOL/L (ref 7–16)
AST SERPL-CCNC: 36 U/L (ref 12–45)
BASOPHILS # BLD AUTO: 0.2 % (ref 0–1.8)
BASOPHILS # BLD: 0.02 K/UL (ref 0–0.12)
BILIRUB SERPL-MCNC: 0.6 MG/DL (ref 0.1–1.5)
BUN SERPL-MCNC: 13 MG/DL (ref 8–22)
CALCIUM ALBUM COR SERPL-MCNC: 9.8 MG/DL (ref 8.5–10.5)
CALCIUM SERPL-MCNC: 9.5 MG/DL (ref 8.5–10.5)
CHLORIDE SERPL-SCNC: 102 MMOL/L (ref 96–112)
CO2 SERPL-SCNC: 24 MMOL/L (ref 20–33)
CREAT SERPL-MCNC: 0.53 MG/DL (ref 0.5–1.4)
EOSINOPHIL # BLD AUTO: 0.01 K/UL (ref 0–0.51)
EOSINOPHIL NFR BLD: 0.1 % (ref 0–6.9)
ERYTHROCYTE [DISTWIDTH] IN BLOOD BY AUTOMATED COUNT: 48.9 FL (ref 35.9–50)
GFR SERPLBLD CREATININE-BSD FMLA CKD-EPI: 101 ML/MIN/1.73 M 2
GLOBULIN SER CALC-MCNC: 4.4 G/DL (ref 1.9–3.5)
GLUCOSE SERPL-MCNC: 204 MG/DL (ref 65–99)
HCT VFR BLD AUTO: 33.7 % (ref 37–47)
HGB BLD-MCNC: 11.1 G/DL (ref 12–16)
IMM GRANULOCYTES # BLD AUTO: 0.06 K/UL (ref 0–0.11)
IMM GRANULOCYTES NFR BLD AUTO: 0.6 % (ref 0–0.9)
LYMPHOCYTES # BLD AUTO: 0.84 K/UL (ref 1–4.8)
LYMPHOCYTES NFR BLD: 7.7 % (ref 22–41)
MCH RBC QN AUTO: 30.5 PG (ref 27–33)
MCHC RBC AUTO-ENTMCNC: 32.9 G/DL (ref 33.6–35)
MCV RBC AUTO: 92.6 FL (ref 81.4–97.8)
MONOCYTES # BLD AUTO: 0.13 K/UL (ref 0–0.85)
MONOCYTES NFR BLD AUTO: 1.2 % (ref 0–13.4)
NEUTROPHILS # BLD AUTO: 9.84 K/UL (ref 2–7.15)
NEUTROPHILS NFR BLD: 90.2 % (ref 44–72)
NRBC # BLD AUTO: 0 K/UL
NRBC BLD-RTO: 0 /100 WBC
PLATELET # BLD AUTO: 18 K/UL (ref 164–446)
POTASSIUM SERPL-SCNC: 3.1 MMOL/L (ref 3.6–5.5)
PROT SERPL-MCNC: 8 G/DL (ref 6–8.2)
RBC # BLD AUTO: 3.64 M/UL (ref 4.2–5.4)
SODIUM SERPL-SCNC: 136 MMOL/L (ref 135–145)
WBC # BLD AUTO: 10.9 K/UL (ref 4.8–10.8)

## 2023-02-12 PROCEDURE — 700105 HCHG RX REV CODE 258

## 2023-02-12 PROCEDURE — 36415 COLL VENOUS BLD VENIPUNCTURE: CPT

## 2023-02-12 PROCEDURE — 80053 COMPREHEN METABOLIC PANEL: CPT

## 2023-02-12 PROCEDURE — A9270 NON-COVERED ITEM OR SERVICE: HCPCS

## 2023-02-12 PROCEDURE — 770020 HCHG ROOM/CARE - TELE (206)

## 2023-02-12 PROCEDURE — 71260 CT THORAX DX C+: CPT

## 2023-02-12 PROCEDURE — A9270 NON-COVERED ITEM OR SERVICE: HCPCS | Performed by: STUDENT IN AN ORGANIZED HEALTH CARE EDUCATION/TRAINING PROGRAM

## 2023-02-12 PROCEDURE — 99233 SBSQ HOSP IP/OBS HIGH 50: CPT | Mod: GC | Performed by: HOSPITALIST

## 2023-02-12 PROCEDURE — 700102 HCHG RX REV CODE 250 W/ 637 OVERRIDE(OP): Performed by: STUDENT IN AN ORGANIZED HEALTH CARE EDUCATION/TRAINING PROGRAM

## 2023-02-12 PROCEDURE — 700102 HCHG RX REV CODE 250 W/ 637 OVERRIDE(OP)

## 2023-02-12 PROCEDURE — 700117 HCHG RX CONTRAST REV CODE 255: Performed by: INTERNAL MEDICINE

## 2023-02-12 PROCEDURE — 700111 HCHG RX REV CODE 636 W/ 250 OVERRIDE (IP)

## 2023-02-12 PROCEDURE — 85025 COMPLETE CBC W/AUTO DIFF WBC: CPT

## 2023-02-12 RX ORDER — POTASSIUM CHLORIDE 1500 MG/1
40 TABLET, EXTENDED RELEASE ORAL ONCE
Status: COMPLETED | OUTPATIENT
Start: 2023-02-12 | End: 2023-02-12

## 2023-02-12 RX ADMIN — IOHEXOL 96 ML: 350 INJECTION, SOLUTION INTRAVENOUS at 12:50

## 2023-02-12 RX ADMIN — SENNOSIDES AND DOCUSATE SODIUM 2 TABLET: 50; 8.6 TABLET ORAL at 05:15

## 2023-02-12 RX ADMIN — SODIUM CHLORIDE 1000 MG: 9 INJECTION, SOLUTION INTRAVENOUS at 00:35

## 2023-02-12 RX ADMIN — POTASSIUM CHLORIDE 40 MEQ: 1500 TABLET, EXTENDED RELEASE ORAL at 11:52

## 2023-02-12 ASSESSMENT — ENCOUNTER SYMPTOMS
HEARTBURN: 0
NAUSEA: 0
WEIGHT LOSS: 0
BACK PAIN: 0
DIZZINESS: 0
FEVER: 0
SPEECH CHANGE: 0
PND: 0
ORTHOPNEA: 0
WHEEZING: 0
VOMITING: 0
DIARRHEA: 0
ABDOMINAL PAIN: 0
CHILLS: 0
SHORTNESS OF BREATH: 0
SPUTUM PRODUCTION: 0
CLAUDICATION: 0
FOCAL WEAKNESS: 0
TINGLING: 1
FLANK PAIN: 0
LOSS OF CONSCIOUSNESS: 0
BLURRED VISION: 0
SORE THROAT: 0
PSYCHIATRIC NEGATIVE: 1
COUGH: 0
SEIZURES: 0
HEADACHES: 0
MYALGIAS: 0
BLOOD IN STOOL: 0
PALPITATIONS: 0
HEMOPTYSIS: 0
NECK PAIN: 0
DOUBLE VISION: 0
CONSTIPATION: 0
WEAKNESS: 0
SENSORY CHANGE: 0
SINUS PAIN: 0
TREMORS: 0
BRUISES/BLEEDS EASILY: 0
DIAPHORESIS: 0

## 2023-02-12 ASSESSMENT — COGNITIVE AND FUNCTIONAL STATUS - GENERAL
STANDING UP FROM CHAIR USING ARMS: A LITTLE
WALKING IN HOSPITAL ROOM: A LITTLE
SUGGESTED CMS G CODE MODIFIER DAILY ACTIVITY: CH
DAILY ACTIVITIY SCORE: 24
MOVING FROM LYING ON BACK TO SITTING ON SIDE OF FLAT BED: A LITTLE
MOVING TO AND FROM BED TO CHAIR: A LITTLE
MOBILITY SCORE: 19
SUGGESTED CMS G CODE MODIFIER MOBILITY: CK
CLIMB 3 TO 5 STEPS WITH RAILING: A LITTLE

## 2023-02-12 ASSESSMENT — PAIN DESCRIPTION - PAIN TYPE
TYPE: ACUTE PAIN
TYPE: ACUTE PAIN

## 2023-02-12 ASSESSMENT — FIBROSIS 4 INDEX: FIB4 SCORE: 16.49

## 2023-02-12 NOTE — ED NOTES
TASNEEM from Lab called with critical result of PLATELETS 11 at 1733. Critical lab result read back to TASNEEM.   Dr. Rivera notified of critical lab result at 1733.  Critical lab result read back by Dr. Rivera.

## 2023-02-12 NOTE — ASSESSMENT & PLAN NOTE
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   Platelets of 11. Presented to ED because outpatient labs showed platelets of 12 day before admission.   Reports recent hospitalization at [facility] 01/22/23 for 2 weeks for severe thrombocytopenia with platelets of 2, was discharged 1 week ago.   No petechiae. Notes minimal nosebleed from picking at nose, stopped bleeding after 20 minutes.   No current bleeding symptoms. ERP spoke with hematology who recommended admission, IVIG, solumedrol and platelet transfusion; that they would see her in the morning.  Plan:  -LDH, B12, FRANCESCA, Hepatitis panel, HIV ordered  -1g IV solumedrol for 3 days  -IVIG 1g/kg/day for 2 days  -Transfuse 1 unit of platelets  -Consult hematology in the morning

## 2023-02-12 NOTE — CARE PLAN
The patient is Stable - Low risk of patient condition declining or worsening    Shift Goals  Clinical Goals: Remain free from falls, plt > 20, remain O x4  Patient Goals: Find out treatment plan  Family Goals: Find out treatment plan      Problem: Knowledge Deficit - Standard  Goal: Patient and family/care givers will demonstrate understanding of plan of care, disease process/condition, diagnostic tests and medications  Outcome: Progressing     Problem: Risk for Bleeding  Goal: Patient will take measures to prevent bleeding and recognizes signs of bleeding that need to be reported immediately to a health care professional  Outcome: Progressing  Goal: Patient will not experience bleeding as evidenced by normal blood pressure, stable hematocrit and hemoglobin levels and desired ranges for coagulation profiles  Outcome: Progressing     Problem: Risk for Bleeding  Goal: Patient will not experience bleeding as evidenced by normal blood pressure, stable hematocrit and hemoglobin levels and desired ranges for coagulation profiles  Outcome: Progressing     Problem: Fall Risk  Goal: Patient will remain free from falls  Outcome: Progressing     Problem: Hemodynamics  Goal: Patient's hemodynamics, fluid balance and neurologic status will be stable or improve  Outcome: Progressing       Progress made toward(s) clinical / shift goals: Pt admitted for Plt count of 11, s/p 1 unit platelet transfusion. Morning labs pending. Pt calls appropriately for help to bathroom. Appropriate fall precautions in place. No visible bleeding noted.     Patient is not progressing towards the following goals: N/A

## 2023-02-12 NOTE — PROGRESS NOTES
Dr. Winchester notified of decreasing BP since starting Gammagard infusion, currently 100/60, with HR occasionally dropping to high 58s. Pt is asleep in bed with no distress, asymptomatic. MD states to try decreasing rate to 20 ml/hr if BP and HR continues to drop, and notify MD of MAP 70.

## 2023-02-12 NOTE — PROGRESS NOTES
4 Eyes Skin Assessment Completed by DONAVAN Cornejo and , RN.    Head WDL  Ears WDL  Nose WDL  Mouth WDL  Neck WDL  Breast/Chest WDL  Shoulder Blades WDL  Spine WDL  (R) Arm/Elbow/Hand Bruising  (L) Arm/Elbow/Hand Bruising  Abdomen WDL  Groin WDL  Scrotum/Coccyx/Buttocks WDL  (R) Leg WDL  (L) Leg WDL  (R) Heel/Foot/Toe WDL  (L) Heel/Foot/Toe WDL          Devices In Places Tele Box, Blood Pressure Cuff, and Pulse Ox      Interventions In Place Pillows    Possible Skin Injury Yes    Pictures Uploaded Into Epic N/A  Wound Consult Placed N/A  RN Wound Prevention Protocol Ordered No

## 2023-02-12 NOTE — ASSESSMENT & PLAN NOTE
Unknown baseline platelet count, subacute, presenting with oral and extremities petechia 1 month ago.  Mild anemia but no signs of hemolysis (nl bili and LDH) and coagulation times within normal limits. No history of easy bruising/bleeding/clotting events.  No exposure to new medications or heparin. Recent hospitalization at [facility] 01/22/23 for 2 weeks for severe thrombocytopenia with platelets of 2.  There she received Eltrombopag, IVIG, Decadron, and was discharged 1 week ago on no therapy. Viral hepatitis panel and HIV negative.  Continue work-up, possible ITP. Patient is hemodynamically stable, no ongoing bleeding. CT chest/abdomen/pelvis unremarkable.  FRANCESCA not detectable.  Heme/onc following, appreciate recommendations.   -Switched Solu-Medrol to prednisone 1 mg/kg (begun on 2/13) for 3 weeks followed by taper   -will give additional IVIG if drop in platelets recurs, last dose 2/12  -Platelet improving 28 > 49 this AM  -bone marrow biopsy on 2/15  -Begun on SSI while on high-dose steroids on 2/13, will monitor for continued need  -consider Bactrim for PJP prophylaxis upon discharge  -We will likely need outpatient DEXA scan due to necessary long-term use of steroids  -Continue to monitor

## 2023-02-12 NOTE — H&P
"[facility] Internal Medicine History & Physical Note    Date of Service  2/11/2023    [facility] Team: Night team  Attending: John Solano M.d.  Senior Resident: Dr. Darci Winchester  Contact Number: [phone]    Primary Care Physician  MALLIKA Nuñez    Code Status  Full Code    Chief Complaint  Chief Complaint   Patient presents with    Abnormal Labs     Sent for low platelets \"12\"       History of Presenting Illness (HPI):         Virgen Boone is a 67 y.o. Sinhala speaking female with past medical history of hypertension and hyperlipidemia who presented 2/11/2023 for thrombocytopenia.          Patient was recently hospitalized at [facility] for 2 weeks for severe thrombocytopenia. She reports her platelet counts were 2. She initially went into the hospital because she was experiencing gingival bleeding and petechiae on arms and legs. She reports that she was there until her platelet count richard above 50,000. She was not discharged on any medications and had follow-up blood work in 1 week (yesterday). Her blood work yesterday resulted today showing platelets of 12 and she was informed by her PCP to head to ED. She notes that did have a nose bleed lasting 20 minutes, but notes that it was after scraping the inside of her nose and that it was minimal blood blood. She has not had any petechiae or gingival bleeding. Her only symptoms are that she has a headache         In the ED, patient was found to be afebrile at 97.8F, heart rate of 101, respiratory rate of 16, blood pressure of 131/94 mmHg, satting 96% on room air. Platelets notable for 11. Hemoglobin at 11.8. Potassium at 3.1. AST 39, ALT 72. CT head negative for bleed. ER physician spoke with hematology who recommended IVIG, solumedrol and platelets and that they would see her in the morning. Patient to be admitted to medicine for severe thrombocytopenia.     Maori interpretor [id number] Marii [id number] were called though connection from " both calls continued to get cut off. Family was able to help explain patient's recent hospital stay.     I discussed the plan of care with  ER physician and nocturnist, Dr. Solano  .    Review of Systems  Review of Systems   Constitutional:  Negative for chills and fever.   HENT:  Positive for nosebleeds (minimal nose bleed after picking at nose).    Eyes:  Negative for blurred vision and double vision.   Respiratory:  Negative for shortness of breath and wheezing.    Cardiovascular:  Negative for chest pain, palpitations and leg swelling.   Gastrointestinal:  Negative for nausea and vomiting.   Skin:  Negative for rash.   Neurological:  Positive for headaches.     Past Medical History   has a past medical history of Hypertension.    Surgical History   has no past surgical history on file.     Family History  No family history of bleeding disorders.   Sister  from  from diabetes complications.   Family history reviewed with patient.     Social History  Tobacco: None.  Alcohol: None.  Recreational drugs (illegal or prescription): None.    Allergies  No Known Allergies    Medications  Prior to Admission Medications   Prescriptions Last Dose Informant Patient Reported? Taking?   atorvastatin (LIPITOR) 20 MG Tab 2/10/2023 at 2130 Patient Yes Yes   Sig: Take 20 mg by mouth every evening.   hydroCHLOROthiazide (HYDRODIURIL) 25 MG Tab 2023 at 0800 Patient Yes Yes   Sig: Take 25 mg by mouth every day.      Facility-Administered Medications: None       Physical Exam  Temp:  [36.6 °C (97.8 °F)-37.1 °C (98.8 °F)] 37.1 °C (98.8 °F)  Pulse:  [] 78  Resp:  [16-56] 56  BP: (101-131)/(63-94) 115/78  SpO2:  [93 %-96 %] 94 %  Blood Pressure : 115/78   Temperature: 37.1 °C (98.8 °F)   Pulse: 78   Respiration: (!) 56   Pulse Oximetry: 94 %       Physical Exam  Constitutional:       Appearance: Normal appearance.   HENT:      Head: Normocephalic and atraumatic.   Eyes:      General: No scleral icterus.      Extraocular Movements: Extraocular movements intact.      Conjunctiva/sclera: Conjunctivae normal.      Pupils: Pupils are equal, round, and reactive to light.   Cardiovascular:      Rate and Rhythm: Normal rate and regular rhythm.      Heart sounds: No murmur heard.  Pulmonary:      Effort: Pulmonary effort is normal.      Breath sounds: Normal breath sounds. No wheezing.   Abdominal:      General: Abdomen is flat. Bowel sounds are normal.      Palpations: Abdomen is soft.      Tenderness: There is no abdominal tenderness. There is no guarding or rebound.   Musculoskeletal:      Right lower leg: No edema.      Left lower leg: No edema.   Skin:     General: Skin is warm and dry.      Findings: No bruising, lesion or rash.   Neurological:      Mental Status: She is alert and oriented to person, place, and time. Mental status is at baseline.   Psychiatric:         Mood and Affect: Mood normal.         Behavior: Behavior normal.       Laboratory:  Recent Labs     02/11/23  1626   WBC 8.1   RBC 3.85*   HEMOGLOBIN 11.8*   HEMATOCRIT 34.5*   MCV 89.6   MCH 30.6   MCHC 34.2   RDW 47.4   PLATELETCT 11*     Recent Labs     02/11/23  1626   SODIUM 140   POTASSIUM 3.1*   CHLORIDE 104   CO2 26   GLUCOSE 108*   BUN 11   CREATININE 0.42*   CALCIUM 9.3     Recent Labs     02/11/23  1626   ALTSGPT 72*   ASTSGOT 39   ALKPHOSPHAT 126*   TBILIRUBIN 0.4   GLUCOSE 108*     Recent Labs     02/11/23  1626   APTT 27.0   INR 0.99     No results for input(s): NTPROBNP in the last 72 hours.      No results for input(s): TROPONINT in the last 72 hours.    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     I anticipate this patient will require at least two midnights for appropriate medical management, necessitating  inpatient admission because severe thrombocytopenia    Patient will need a telemetry bed on MEDICAL service .  The need is secondary to severe\ thrombocytopenia.    * Severe thrombocytopenia (HCC)- (present on admission)  Assessment & Plan  Likely immune thrombocytopenia.   Platelets of 11. Presented to ED because outpatient labs showed platelets of 12 day before admission.   Reports recent hospitalization at [facility] 01/22/23 for 2 weeks for severe thrombocytopenia with platelets of 2, was discharged 1 week ago.   No petechiae. Notes minimal nosebleed from picking at nose, stopped bleeding after 20 minutes.   No current bleeding symptoms. ERP spoke with hematology who recommended admission, IVIG, solumedrol and platelet transfusion; that they would see her in the morning.  Plan:  -LDH, B12, FRANCESCA, Hepatitis panel, HIV ordered  -1g IV solumedrol for 3 days  -IVIG 1g/kg/day for 2 days  -Transfuse 1 unit of platelets  -Consult hematology in the morning    Hypokalemia  Assessment & Plan  K of 3.1 on admission.   -40 of K dur ordered    Hyperlipidemia  Assessment & Plan  -Holding atorvastatin 20 mg once daily as possible drug induced thrombocytopenia    Hypertension  Assessment & Plan  Blood pressure at 131/94 mmHg.  -Holding hydrochlorothiazide as it could contribute to thrombocytopenia.         VTE prophylaxis: SCDs/TEDs

## 2023-02-12 NOTE — ED NOTES
Med rec updated and complete. Allergies reviewed.  Confirmed name and date of birth.   No antibiotic use in last 30 days.      [facility] [phone]

## 2023-02-12 NOTE — CONSULTS
Consult Note: Hematology/Oncology    Date of consultation: 2/12/2023 9:03 AM    Referring provider: Dr Trinidad    Reason for consultation: Severe thrombocytopenia      History of presenting illness:     67-year-old woman with no significant past medical history, she was admitted to [facility] around 3 weeks ago for severe thrombocytopenia epistaxis, gingival hemorrhagic blistering and spontaneous hematomas.  She was in the hospital for 2 weeks, she was treated with IVIG and steroids, she was discharged with a platelet count of 55,000.  Unfortunately she did not have follow-ups and she did not continue on steroids upon discharge.  She was discharged around a week ago  From [facility].     She is coming back with epistaxis, also some hematomas in the upper extremities.  She was seen by PCP, she was found to have low platelet count and was sent to the ER.  Over here she was found to have a platelet count of 11,000.  WBC/ANC WNL, HB slightly decreased .  Chemistry profile not remarkable.  Total bilirubin and LDH within normal limits, no signs of hemolysis.    The patient denies any recent  infections, viral illnesses.  No new medications.  She denies weight loss, fatigue, weakness, night sweats, denies joint aches or pains.  She denies illicit drug use, she is  and she is taking care of her kids at home.  Vitamin B12 normal, hepatitis panel, HIV nonreactive.  He was complaining of mild headaches during admission,  CT head within normal limits with no evidence of hemorrhage.     Past Medical History:    Past Medical History:   Diagnosis Date    Hypertension    ITP    Past surgical history:  History reviewed. No pertinent surgical history.    Allergies:  Patient has no known allergies.    Medications:    Current Facility-Administered Medications   Medication Dose Route Frequency Provider Last Rate Last Admin    senna-docusate (PERICOLACE or SENOKOT S) 8.6-50 MG per tablet 2 Tablet  2 Tablet Oral  BID John Solano M.D.   2 Tablet at 02/12/23 0515    And    polyethylene glycol/lytes (MIRALAX) PACKET 1 Packet  1 Packet Oral QDAY PRN John Solano M.D.        And    magnesium hydroxide (MILK OF MAGNESIA) suspension 30 mL  30 mL Oral QDAY PRN John Solano M.D.        And    bisacodyl (DULCOLAX) suppository 10 mg  10 mg Rectal QDAY PRN John Solano M.D.        MD ALERT...Pharmacist to Dose IVIG 1 Each  1 Each Other PHARMACY TO DOSE Darci Winchester M.D.        methylPREDNISolone sod succ (Solu-Medrol) 1,000 mg in  mL IVPB  1 g Intravenous DAILY Darci Winchester M.D.   Stopped at 02/12/23 0135    immune globulin (Gammagard) 45 g in empty bag 450 mL IVIG  1 g/kg/day (Ideal) Intravenous Daily-1400 Darci Winchester M.D. 20 mL/hr at 02/12/23 0529 Rate Change at 02/12/23 0529       Social History:     Social History     Socioeconomic History    Marital status:      Spouse name: Not on file    Number of children: Not on file    Years of education: Not on file    Highest education level: Not on file   Occupational History    Not on file   Tobacco Use    Smoking status: Never    Smokeless tobacco: Never   Vaping Use    Vaping Use: Never used   Substance and Sexual Activity    Alcohol use: Not Currently    Drug use: Not Currently    Sexual activity: Not on file   Other Topics Concern    Not on file   Social History Narrative    Not on file     Social Determinants of Health     Financial Resource Strain: Not on file   Food Insecurity: Not on file   Transportation Needs: Not on file   Physical Activity: Not on file   Stress: Not on file   Social Connections: Not on file   Intimate Partner Violence: Not on file   Housing Stability: Not on file       Family History:   History reviewed. No pertinent family history.    Review of Systems:  All other review of systems are negative except what was mentioned above in the HPI.    Constitutional: No fever, chills, weight loss ,malaise/fatigue.     HEENT: No new auditory or visual complaints. No sore throat and neck pain.  Did complain of epistaxis  Respiratory:No new cough, sputum production, shortness of breath and wheezing.    Cardiovascular: No new chest pain, palpitations, orthopnea and leg swelling.    Gastrointestinal: No heartburn, nausea, vomiting ,abdominal pain, hematochezia or melena     Genitourinary: Negative for dysuria, hematuria    Musculoskeletal: No new arthralgias or myalgias   Skin: Negative for rash and itching.    Neurological: Negative for focal weakness or headaches.    Endo/Heme/Allergies: No abnormal bleed but she does have easy bruising  Psychiatric/Behavioral: No new depression/anxiety.    Physical Exam:  Vitals:   /81   Pulse 66   Temp 36.5 °C (97.7 °F) (Temporal)   Resp 17   Ht 1.524 m (5')   Wt 74.2 kg (163 lb 9.3 oz)   SpO2 94%   BMI 31.95 kg/m²     General: Not in acute distress, alert and oriented x 3  HEENT: No pallor, icterus. Oropharynx clear.   Neck: Supple, no palpable masses.  Lymph nodes: No palpable cervical, supraclavicular, axillary or inguinal lymphadenopathy.    CVS: regular rate and rhythm, no rubs or gallops  RESP: Clear to auscultate bilaterally, no wheezing or crackles.   ABD: Soft, non tender, non distended, positive bowel sounds, no palpable organomegaly  EXT: No edema or cyanosis  CNS: Alert and oriented x3, No focal deficits.  Skin-  she does have some bruises in upper extremities      Labs:   Recent Labs     02/11/23  1626 02/12/23  0431   RBC 3.85* 3.64*   HEMOGLOBIN 11.8* 11.1*   HEMATOCRIT 34.5* 33.7*   PLATELETCT 11* 18*   PROTHROMBTM 13.0  --    APTT 27.0  --    INR 0.99  --      Lab Results   Component Value Date/Time    SODIUM 136 02/12/2023 06:10 AM    POTASSIUM 3.1 (L) 02/12/2023 06:10 AM    CHLORIDE 102 02/12/2023 06:10 AM    CO2 24 02/12/2023 06:10 AM    GLUCOSE 204 (H) 02/12/2023 06:10 AM    BUN 13 02/12/2023 06:10 AM    CREATININE 0.53 02/12/2023 06:10 AM    GLOMRATE 66  02/05/2023 02:12 AM        Assessment and Plan:  1.  ITP  -In late January 2023 was treated in [facility], 2 weeks hospital stay, did receive IVIG and steroids for 2 weeks during hospital stay  with improvement of platelets of 55,000, she was then discharge on no meds.   -2/11/2023  admitted with ITP exacerbation.  Received IVIG, 1 g/kg Solu-Medrol 1000 mg IV and platelet transfusion  -HIV, hepatitis panel nonreactive, FRANCESCA pending    2.  Mild anemia, no evidence of hemolysis, normal LDH and total bilirubin    Plan  -Finishing IVIG today, okay to continue on IVIG if platelet drops.  Stop Solu-Medrol, okay to start prednisone 1 mg/kg for 3 weeks, then taper.  -Requested CT chest, abdomen and pelvis to rule out lymphoproliferative disorder, she will also require a bone marrow biopsy during hospital stay, okay to do bone marrow biopsy once platelet count improved.  -Daily CBC  -Transfuse only if bleeding    Dr. Pagan will start following the patient tomorrow.     She agreed and verbalized her agreement and understanding with the current plan.  I answered all questions and concerns she has at this time.              Thank you for allowing me to participate in her care.    Please note that this dictation was created using voice recognition software. I have made every reasonable attempt to correct obvious errors, but I expect that there are errors of grammar and possibly content that I did not discover before finalizing the note.      SIGNATURES:  Gonzalo Gardiner III, M.D.    CC:  MALLIKA Nuñez

## 2023-02-12 NOTE — PROGRESS NOTES
[facility] Internal Medicine Daily Progress Note    Date of Service  2/12/2023    [facility] Team: R IM Purple Team   Attending: Jesusita Trinidad M.d.  Senior Resident: Dr. Pedroza  Intern:  Dr. Slater   Contact Number: [phone]    Chief Complaint  Virgen Boone is a 67 y.o. female admitted 2/11/2023 with thrombocytopenia.     Hospital Course  past medical history of hypertension and hyperlipidemia who presented 2/11/2023 for thrombocytopenia. She had a recent hospital admision at [facility] for 2 weeks after presenting with gingival petechia and petechiae over arms and legs. Of 2. 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 Platelets improved to 40K at discharge. Now she presents with recurrent thromcobytopenia in outpt labs, 12K. Patient admitted for further assessment and therapy, hematology eval.    Interval Problem Update  No overnight events, no SIRS.   Hep panel negative, LDH wnl and INR and PTT wnl.     She reports no history of easy bruising or easy bleeding, she presented with oral and diffuse extremities petechia 1 month ago for the first time.  She does not known her baseline platelet count.  She denies any history of acute pregnancies, third delivery with postpartum hemorrhage 30 years ago.  No history of clotting events.  No hematuria, hemoptysis, no vaginal bleed, mild red blood last hospital admission associated with external hemorrhoids no other episodes of GI bleed.    She did have a recent travel to [place], however no sick contacts and no unusual exposures, she did have a fall injuring her back but no residual pain or focal weakness.  No recent medications or supplements. Other ROS as follows.     I have discussed this patient's plan of care and discharge plan at IDT rounds today with Case Management, Nursing, Nursing leadership, and other members of the IDT team.    Consultants/Specialty  oncology    Code Status  Full Code    Disposition  Patient is not  medically cleared for discharge.   Anticipate discharge to to home with close outpatient follow-up.  I have placed the appropriate orders for post-discharge needs.    Review of Systems  Review of Systems   Constitutional:  Negative for chills, diaphoresis, fever, malaise/fatigue and weight loss.   HENT:  Negative for congestion, ear pain, hearing loss, sinus pain and sore throat.    Eyes:  Negative for blurred vision and double vision.   Respiratory:  Negative for cough, hemoptysis, sputum production, shortness of breath and wheezing.    Cardiovascular:  Negative for chest pain, palpitations, orthopnea, claudication, leg swelling and PND.   Gastrointestinal:  Negative for abdominal pain, blood in stool, constipation, diarrhea, heartburn, melena, nausea and vomiting.   Genitourinary:  Negative for dysuria, flank pain, frequency, hematuria and urgency.   Musculoskeletal:  Negative for back pain, joint pain, myalgias and neck pain.   Skin:  Negative for itching and rash.   Neurological:  Positive for tingling. Negative for dizziness, tremors (Transient, 1 month ago over the tongue and right leg. Spontaneous resolution.), sensory change, speech change, focal weakness, seizures, loss of consciousness, weakness and headaches.   Endo/Heme/Allergies:  Does not bruise/bleed easily.        Right arm with ecchymosis after IV acces/needle insertion but one of them was spontaneous.    Psychiatric/Behavioral: Negative.        Physical Exam  Temp:  [36.2 °C (97.2 °F)-37.1 °C (98.8 °F)] 36.2 °C (97.2 °F)  Pulse:  [] 95  Resp:  [16-28] 16  BP: ()/(57-98) 126/85  SpO2:  [92 %-95 %] 93 %      General: Awake, alert and oriented.  Head and Neck: NC/AT, EOMI, no scleral icterus or conjunctival pallor, no nasal discharge or oral erythema or exudates, very mild petechia over hard palate. Neck supple, has a submandibular mobile nontender <1cm LAD.  CV: Rhythmic heart sounds, no murmurs, gallops or rubs. No S3,S4 or JVD.   Pulm:  Chest expansion is symmetrical, no crackles, rales, rhonchi, or wheezing.  GI: Flat, non distended, soft, nontender, normal bowel sounds.  Skin: Warm, no rashes, ecchymosis over right arm x3 up to 5cm.   MSK: Normal ROM, no joint swelling. No lower extremity edema. No pain, no lesions.  Neuro: Patient is alert and oriented x3. Speech is clear and fluent. Is able to name, repeat and comprehend. Pupils equal, round and reactive to light. Extra ocular movements intact. Facial and body symmetry. Strength 5 out of 5 in upper and lower extremities. Sensitivity intact. Normal deep tendon reflexes. Normal tone. No gait abnormalities.   Psych: Appropriate mood and affect.      Fluids    Intake/Output Summary (Last 24 hours) at 2/12/2023 1555  Last data filed at 2/12/2023 1400  Gross per 24 hour   Intake 933.59 ml   Output 0 ml   Net 933.59 ml       Laboratory  Recent Labs     02/11/23  1626 02/12/23  0431   WBC 8.1 10.9*   RBC 3.85* 3.64*   HEMOGLOBIN 11.8* 11.1*   HEMATOCRIT 34.5* 33.7*   MCV 89.6 92.6   MCH 30.6 30.5   MCHC 34.2 32.9*   RDW 47.4 48.9   PLATELETCT 11* 18*     Recent Labs     02/11/23  1626 02/12/23  0610   SODIUM 140 136   POTASSIUM 3.1* 3.1*   CHLORIDE 104 102   CO2 26 24   GLUCOSE 108* 204*   BUN 11 13   CREATININE 0.42* 0.53   CALCIUM 9.3 9.5     Recent Labs     02/11/23  1626   APTT 27.0   INR 0.99               Imaging  CT-CHEST,ABDOMEN,PELVIS WITH   Final Result      1.  No evidence of lymphadenopathy in the chest, abdomen, or pelvis.   2.  No evidence of splenomegaly.   3.  No acute inflammatory process.      CT-HEAD W/O   Final Result      Head CT without contrast within normal limits. No evidence of acute cerebral infarction, hemorrhage or mass lesion.              Assessment/Plan  Problem Representation:    * Severe thrombocytopenia (HCC)- (present on admission)  Assessment & Plan  Unknown baseline platelet count, subacute, presenting with oral and extremities petechia 1 month ago.  Mild anemia  but no signs of hemolysis (nl bili and LDH) and coagulation times within normal limits. No history of easy bruising/bleeding/clotting events.  No exposure to new medications or heparin. Recent hospitalization at [facility] 01/22/23 for 2 weeks for severe thrombocytopenia with platelets of 2.  There she received Eltrombopag, IVIG, Decadron, and was discharged 1 week ago on no therapy. Viral hepatitis panel and HIV negative.  Continue work-up, possible ITP. Patient is hemodynamically stable, no ongoing bleeding.  - Switch Solu-Medrol to prednisone 1 mg/kg   - Stop IVIG, restart if drop in platelets  - CT abdomen/pelvis for evaluating liver/spleen, r/o malignancy  - Hematology following, considering BM in the future  - FRANCESCA pending  - Obtain previous CBCs and records from PCP  - Monitor    Hypertension  Assessment & Plan  Controlled since admission.  -Monitor         VTE prophylaxis: SCDs/TEDs    I have performed a physical exam and reviewed and updated ROS and Plan today (2/12/2023). In review of yesterday's note (2/11/2023), there are no changes except as documented above.

## 2023-02-12 NOTE — ASSESSMENT & PLAN NOTE
Blood pressure at 131/94 mmHg.  -Holding hydrochlorothiazide as it could contribute to thrombocytopenia.

## 2023-02-13 LAB
ANION GAP SERPL CALC-SCNC: 10 MMOL/L (ref 7–16)
BASOPHILS # BLD AUTO: 0.1 % (ref 0–1.8)
BASOPHILS # BLD: 0.02 K/UL (ref 0–0.12)
BUN SERPL-MCNC: 19 MG/DL (ref 8–22)
CALCIUM SERPL-MCNC: 9.3 MG/DL (ref 8.5–10.5)
CHLORIDE SERPL-SCNC: 105 MMOL/L (ref 96–112)
CO2 SERPL-SCNC: 22 MMOL/L (ref 20–33)
COMMENT 1642: NORMAL
CREAT SERPL-MCNC: 0.57 MG/DL (ref 0.5–1.4)
EOSINOPHIL # BLD AUTO: 0 K/UL (ref 0–0.51)
EOSINOPHIL NFR BLD: 0 % (ref 0–6.9)
ERYTHROCYTE [DISTWIDTH] IN BLOOD BY AUTOMATED COUNT: 49.4 FL (ref 35.9–50)
GFR SERPLBLD CREATININE-BSD FMLA CKD-EPI: 99 ML/MIN/1.73 M 2
GLUCOSE SERPL-MCNC: 158 MG/DL (ref 65–99)
HCT VFR BLD AUTO: 32.2 % (ref 37–47)
HGB BLD-MCNC: 10.7 G/DL (ref 12–16)
IMM GRANULOCYTES # BLD AUTO: 0.16 K/UL (ref 0–0.11)
IMM GRANULOCYTES NFR BLD AUTO: 1.1 % (ref 0–0.9)
LYMPHOCYTES # BLD AUTO: 1 K/UL (ref 1–4.8)
LYMPHOCYTES NFR BLD: 6.7 % (ref 22–41)
MAGNESIUM SERPL-MCNC: 1.8 MG/DL (ref 1.5–2.5)
MCH RBC QN AUTO: 30.7 PG (ref 27–33)
MCHC RBC AUTO-ENTMCNC: 33.2 G/DL (ref 33.6–35)
MCV RBC AUTO: 92.5 FL (ref 81.4–97.8)
MONOCYTES # BLD AUTO: 0.51 K/UL (ref 0–0.85)
MONOCYTES NFR BLD AUTO: 3.4 % (ref 0–13.4)
MORPHOLOGY BLD-IMP: NORMAL
NEUTROPHILS # BLD AUTO: 13.15 K/UL (ref 2–7.15)
NEUTROPHILS NFR BLD: 88.7 % (ref 44–72)
NRBC # BLD AUTO: 0 K/UL
NRBC BLD-RTO: 0 /100 WBC
PLATELET # BLD AUTO: 21 K/UL (ref 164–446)
PMV BLD AUTO: 14 FL (ref 9–12.9)
POTASSIUM SERPL-SCNC: 3.7 MMOL/L (ref 3.6–5.5)
RBC # BLD AUTO: 3.48 M/UL (ref 4.2–5.4)
SODIUM SERPL-SCNC: 137 MMOL/L (ref 135–145)
WBC # BLD AUTO: 14.8 K/UL (ref 4.8–10.8)

## 2023-02-13 PROCEDURE — 99233 SBSQ HOSP IP/OBS HIGH 50: CPT | Mod: GC | Performed by: HOSPITALIST

## 2023-02-13 PROCEDURE — 700111 HCHG RX REV CODE 636 W/ 250 OVERRIDE (IP)

## 2023-02-13 PROCEDURE — 83735 ASSAY OF MAGNESIUM: CPT

## 2023-02-13 PROCEDURE — 80048 BASIC METABOLIC PNL TOTAL CA: CPT

## 2023-02-13 PROCEDURE — 770020 HCHG ROOM/CARE - TELE (206)

## 2023-02-13 PROCEDURE — 85025 COMPLETE CBC W/AUTO DIFF WBC: CPT

## 2023-02-13 PROCEDURE — 36415 COLL VENOUS BLD VENIPUNCTURE: CPT

## 2023-02-13 RX ORDER — INSULIN LISPRO 100 [IU]/ML
1-6 INJECTION, SOLUTION INTRAVENOUS; SUBCUTANEOUS
Status: DISCONTINUED | OUTPATIENT
Start: 2023-02-13 | End: 2023-02-17

## 2023-02-13 RX ADMIN — PREDNISONE 70 MG: 20 TABLET ORAL at 05:17

## 2023-02-13 ASSESSMENT — ENCOUNTER SYMPTOMS
VOMITING: 0
SEIZURES: 0
TREMORS: 0
PHOTOPHOBIA: 0
CONSTIPATION: 0
WHEEZING: 0
BACK PAIN: 0
NAUSEA: 0
NECK PAIN: 0
DIAPHORESIS: 0
FOCAL WEAKNESS: 0
SPUTUM PRODUCTION: 0
BLOOD IN STOOL: 0
BRUISES/BLEEDS EASILY: 0
SHORTNESS OF BREATH: 0
ABDOMINAL PAIN: 0
SINUS PAIN: 0
DEPRESSION: 0
MYALGIAS: 0
HEARTBURN: 0
SPEECH CHANGE: 0
BLURRED VISION: 0
PND: 0
HEMOPTYSIS: 0
SENSORY CHANGE: 0
HEADACHES: 0
ORTHOPNEA: 0
HEADACHES: 1
DIZZINESS: 0
DIARRHEA: 0
WEIGHT LOSS: 0
CLAUDICATION: 0
CHILLS: 0
DOUBLE VISION: 0
COUGH: 0
TINGLING: 1
PALPITATIONS: 0
FEVER: 0
SORE THROAT: 0
WEAKNESS: 0
LOSS OF CONSCIOUSNESS: 0
FLANK PAIN: 0
PSYCHIATRIC NEGATIVE: 1

## 2023-02-13 ASSESSMENT — PAIN DESCRIPTION - PAIN TYPE
TYPE: ACUTE PAIN;CHRONIC PAIN
TYPE: ACUTE PAIN

## 2023-02-13 ASSESSMENT — FIBROSIS 4 INDEX: FIB4 SCORE: 14.14

## 2023-02-13 NOTE — CARE PLAN
The patient is Stable - Low risk of patient condition declining or worsening    Shift Goals  Clinical Goals: Remain free from falls, plt > 20, remain O x4  Patient Goals: Find out treatment plan  Family Goals: Find out treatment plan    Progress made toward(s) clinical / shift goals:      Problem: Knowledge Deficit - Standard  Goal: Patient and family/care givers will demonstrate understanding of plan of care, disease process/condition, diagnostic tests and medications  Outcome: Progressing     Problem: Risk for Bleeding  Goal: Patient will take measures to prevent bleeding and recognizes signs of bleeding that need to be reported immediately to a health care professional  Outcome: Progressing  Goal: Patient will not experience bleeding as evidenced by normal blood pressure, stable hematocrit and hemoglobin levels and desired ranges for coagulation profiles  Outcome: Progressing     Problem: Hemodynamics  Goal: Patient's hemodynamics, fluid balance and neurologic status will be stable or improve  Outcome: Progressing

## 2023-02-13 NOTE — CARE PLAN
The patient is Watcher - Medium risk of patient condition declining or worsening    Shift Goals  Clinical Goals: monitor platelets  Patient Goals: rest  Family Goals: Find out treatment plan    Progress made toward(s) clinical / shift goals:      Patient is not progressing towards the following goals:

## 2023-02-13 NOTE — PROGRESS NOTES
Oncology/Hematology Progress Note               Author: Finn Pagan M.D. Date & Time created: 2/13/2023  9:16 AM     Interval History:    She is doing okay.  No issues overnight.  Her daughter is on the telephone to help with translation.    Review of Systems:  Review of Systems   Constitutional:  Negative for chills, fever, malaise/fatigue and weight loss.   HENT:  Negative for ear pain, hearing loss and tinnitus.    Eyes:  Negative for blurred vision, double vision and photophobia.   Respiratory:  Negative for cough and hemoptysis.    Cardiovascular:  Negative for chest pain, palpitations and orthopnea.   Gastrointestinal:  Negative for heartburn, nausea and vomiting.   Genitourinary:  Negative for dysuria and urgency.   Musculoskeletal:  Negative for myalgias and neck pain.   Neurological:  Negative for dizziness and headaches.   Endo/Heme/Allergies:  Does not bruise/bleed easily.   Psychiatric/Behavioral:  Negative for depression.      Physical Exam:  Physical Exam  Eyes:      Pupils: Pupils are equal, round, and reactive to light.   Cardiovascular:      Rate and Rhythm: Normal rate and regular rhythm.   Pulmonary:      Effort: Pulmonary effort is normal.      Breath sounds: Normal breath sounds.   Abdominal:      General: Bowel sounds are normal. There is no distension.      Palpations: Abdomen is soft.   Musculoskeletal:         General: No swelling.   Skin:     General: Skin is warm.      Coloration: Skin is not jaundiced.   Neurological:      General: No focal deficit present.      Mental Status: She is alert and oriented to person, place, and time.   Psychiatric:         Mood and Affect: Mood normal.         Behavior: Behavior normal.         Thought Content: Thought content normal.         Judgment: Judgment normal.       Labs:          Recent Labs     02/11/23  1626 02/12/23  0610 02/13/23  0247   SODIUM 140 136 137   POTASSIUM 3.1* 3.1* 3.7   CHLORIDE 104 102 105   CO2 26 24 22   BUN 11 13 19    CREATININE 0.42* 0.53 0.57   MAGNESIUM  --   --  1.8   CALCIUM 9.3 9.5 9.3     Recent Labs     23   ALTSGPT 72* 66*  --    ASTSGOT 39 36  --    ALKPHOSPHAT 126* 113*  --    TBILIRUBIN 0.4 0.6  --    GLUCOSE 108* 204* 158*     Recent Labs     23   RBC 3.85* 3.64* 3.48*   HEMOGLOBIN 11.8* 11.1* 10.7*   HEMATOCRIT 34.5* 33.7* 32.2*   PLATELETCT 11* 18* 21*   PROTHROMBTM 13.0  --   --    APTT 27.0  --   --    INR 0.99  --   --      Recent Labs     23   WBC 8.1 10.9*  --  14.8*   NEUTSPOLYS 69.20 90.20*  --  88.70*   LYMPHOCYTES 20.60* 7.70*  --  6.70*   MONOCYTES 9.00 1.20  --  3.40   EOSINOPHILS 0.40 0.10  --  0.00   BASOPHILS 0.40 0.20  --  0.10   ASTSGOT 39  --  36  --    ALTSGPT 72*  --  66*  --    ALKPHOSPHAT 126*  --  113*  --    TBILIRUBIN 0.4  --  0.6  --      Recent Labs     23  024   SODIUM 140 136 137   POTASSIUM 3.1* 3.1* 3.7   CHLORIDE 104 102 105   CO2 26 24 22   GLUCOSE 108* 204* 158*   BUN 11 13 19   CREATININE 0.42* 0.53 0.57   CALCIUM 9.3 9.5 9.3     Hemodynamics:  Temp (24hrs), Av.3 °C (97.3 °F), Min:36.2 °C (97.2 °F), Max:36.3 °C (97.3 °F)  Temperature: 36.2 °C (97.2 °F), Monitored Temp: 36.8 °C (98.2 °F)  Pulse  Av.5  Min: 58  Max: 107   Blood Pressure : 123/83     Respiratory:    Respiration: 18, Pulse Oximetry: 93 %           Fluids:    Intake/Output Summary (Last 24 hours) at 2023 0916  Last data filed at 2023 1700  Gross per 24 hour   Intake 480 ml   Output --   Net 480 ml     Weight: 75.4 kg (166 lb 3.6 oz)  GI/Nutrition:  Orders Placed This Encounter   Procedures    Diet Order Diet: Regular     Standing Status:   Standing     Number of Occurrences:   1     Order Specific Question:   Diet:     Answer:   Regular [1]     Medical Decision Making, by Problem:  Active Hospital Problems    Diagnosis      *Severe thrombocytopenia (HCC) [D69.6]     Hypertension [I10]     Hyperlipidemia [E78.5]     Hypokalemia [E87.6]        Plan:    Thrombocytopenia since January 2023 (primary versus secondary still under work-up)  Hypertension history  Mild normocytic anemia may be delusional since she was normal when she came in with an H&H of 14 and 41.  Leukocytosis secondary to steroids    Treatment    IVIG 1 g/kg finished on 2/12/2023  IV Solu-Medrol changed to prednisone 70 mg    Laboratories    Normal LDH, mild elevation in ALT, negative hepatitis panel, negative HIV, normal bilirubin, coags normal    FRANCESCA pending    Plan for 2/13/2023    --Continue prednisone.  I would like to see her platelets continue to trend up before any consideration for discharge.    --She should get bone marrow biopsy while she is here.  If platelets continue to go up tomorrow then I think bone marrow can be set up.  --CT scans negative for any lymphadenopathy or masses.  --No signs of hemolysis at this time  -- Can consider PPI since she will be on steroids for some time to reduce risk of gastritis/nausea      High complexity/complex decision making/discussion with daughter who help with translation during the visit    Quality-Core Measures   Reviewed items::  Labs reviewed, Medications reviewed and Radiology images reviewed

## 2023-02-14 ENCOUNTER — APPOINTMENT (OUTPATIENT)
Dept: RADIOLOGY | Facility: MEDICAL CENTER | Age: 68
DRG: 813 | End: 2023-02-14
Payer: COMMERCIAL

## 2023-02-14 LAB
BASOPHILS # BLD AUTO: 0.1 % (ref 0–1.8)
BASOPHILS # BLD: 0.01 K/UL (ref 0–0.12)
EOSINOPHIL # BLD AUTO: 0.01 K/UL (ref 0–0.51)
EOSINOPHIL NFR BLD: 0.1 % (ref 0–6.9)
ERYTHROCYTE [DISTWIDTH] IN BLOOD BY AUTOMATED COUNT: 50.2 FL (ref 35.9–50)
GLUCOSE BLD STRIP.AUTO-MCNC: 89 MG/DL (ref 65–99)
GLUCOSE SERPL-MCNC: 100 MG/DL (ref 65–99)
HCT VFR BLD AUTO: 31.4 % (ref 37–47)
HGB BLD-MCNC: 10.4 G/DL (ref 12–16)
IMM GRANULOCYTES # BLD AUTO: 0.04 K/UL (ref 0–0.11)
IMM GRANULOCYTES NFR BLD AUTO: 0.4 % (ref 0–0.9)
LYMPHOCYTES # BLD AUTO: 2.36 K/UL (ref 1–4.8)
LYMPHOCYTES NFR BLD: 25.3 % (ref 22–41)
MCH RBC QN AUTO: 30.7 PG (ref 27–33)
MCHC RBC AUTO-ENTMCNC: 33.1 G/DL (ref 33.6–35)
MCV RBC AUTO: 92.6 FL (ref 81.4–97.8)
MONOCYTES # BLD AUTO: 0.48 K/UL (ref 0–0.85)
MONOCYTES NFR BLD AUTO: 5.1 % (ref 0–13.4)
NEUTROPHILS # BLD AUTO: 6.44 K/UL (ref 2–7.15)
NEUTROPHILS NFR BLD: 69 % (ref 44–72)
NRBC # BLD AUTO: 0 K/UL
NRBC BLD-RTO: 0 /100 WBC
PLATELET # BLD AUTO: 23 K/UL (ref 164–446)
PMV BLD AUTO: 13.5 FL (ref 9–12.9)
RBC # BLD AUTO: 3.39 M/UL (ref 4.2–5.4)
WBC # BLD AUTO: 9.3 K/UL (ref 4.8–10.8)

## 2023-02-14 PROCEDURE — 85025 COMPLETE CBC W/AUTO DIFF WBC: CPT

## 2023-02-14 PROCEDURE — 700111 HCHG RX REV CODE 636 W/ 250 OVERRIDE (IP)

## 2023-02-14 PROCEDURE — 36415 COLL VENOUS BLD VENIPUNCTURE: CPT

## 2023-02-14 PROCEDURE — 82947 ASSAY GLUCOSE BLOOD QUANT: CPT

## 2023-02-14 PROCEDURE — 770020 HCHG ROOM/CARE - TELE (206)

## 2023-02-14 PROCEDURE — 82962 GLUCOSE BLOOD TEST: CPT

## 2023-02-14 PROCEDURE — 99233 SBSQ HOSP IP/OBS HIGH 50: CPT | Mod: GC | Performed by: HOSPITALIST

## 2023-02-14 RX ADMIN — PREDNISONE 70 MG: 20 TABLET ORAL at 06:39

## 2023-02-14 ASSESSMENT — ENCOUNTER SYMPTOMS
SEIZURES: 0
SINUS PAIN: 0
BLOOD IN STOOL: 0
CHILLS: 0
MYALGIAS: 0
SPUTUM PRODUCTION: 0
CONSTIPATION: 0
DIARRHEA: 0
PSYCHIATRIC NEGATIVE: 1
SPEECH CHANGE: 0
NAUSEA: 0
SHORTNESS OF BREATH: 0
LOSS OF CONSCIOUSNESS: 0
PND: 0
SORE THROAT: 0
BLURRED VISION: 0
HEADACHES: 1
PALPITATIONS: 0
BACK PAIN: 0
NECK PAIN: 0
SENSORY CHANGE: 0
DIZZINESS: 0
WHEEZING: 0
ORTHOPNEA: 0
CLAUDICATION: 0
FLANK PAIN: 0
WEIGHT LOSS: 0
COUGH: 0
FOCAL WEAKNESS: 0
ABDOMINAL PAIN: 0
TREMORS: 0
FEVER: 0
VOMITING: 0
HEMOPTYSIS: 0
DOUBLE VISION: 0
HEARTBURN: 0
WEAKNESS: 0
BRUISES/BLEEDS EASILY: 0
TINGLING: 1
DIAPHORESIS: 0

## 2023-02-14 ASSESSMENT — PAIN DESCRIPTION - PAIN TYPE
TYPE: ACUTE PAIN;CHRONIC PAIN
TYPE: ACUTE PAIN;CHRONIC PAIN

## 2023-02-14 NOTE — PROGRESS NOTES
Monitor Summary:     Rhythm: SR, ST  Rate:   Ectopy: (R) PVC's  Measurements: .15/.09/.39           12 Hour Chart Check

## 2023-02-14 NOTE — CARE PLAN
The patient is - Medium risk of patient condition declining or worsening.     Shift Goals  Clinical Goals: Monitor Platelets, Prep for Bone Marrow Biopsy  Patient Goals: Comfort, DC home  Family Goals: Find out treatment plan    Progress made toward(s) clinical / shift goals:        Problem: Knowledge Deficit - Standard  Goal: Patient and family/care givers will demonstrate understanding of plan of care, disease process/condition, diagnostic tests and medications.  Patient is understanding the indication for the Bone Marrow biopsy and that it is to R/O CA.  Outcome: Progressing     Problem: Fall Risk  Goal: Patient will remain free from falls. Patient uses call light appropriately and verbalizes safety precautions while walking around in her room.   Outcome: Progressing     Problem: Hemodynamics  Goal: Patient's hemodynamics, fluid balance and neurologic status will be stable or improve. Patient's platelet level has risen since having the transfusion.   Outcome: Progressing       Patient is not progressing towards the following goals:

## 2023-02-14 NOTE — PROGRESS NOTES
Bedside report received by RN and patient care assumed. Tele Box in place, patient is A&O4, resting in bed, and on RA. Patient states 0/10 pain. Fall precautions in place and patient educated on use of call light for assistance. Pt updated on POC with no questions or concerns. Hourly monitoring initiated.

## 2023-02-14 NOTE — HOSPITAL COURSE
Virgen Boone is a 68 yo female w/ past medical history of hypertension and hyperlipidemia who presented 2/11/2023 for thrombocytopenia. She had a recent hospital admision at [facility] for 2 weeks after presenting with gingival petechia and petechiae over arms and legs. Of 2. 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 Platelets improved to 40K at discharge. Now she presents with recurrent thromcobytopenia in outpt labs, 12K. Patient admitted for further assessment and therapy, hematology eval.

## 2023-02-14 NOTE — PROGRESS NOTES
[facility] Internal Medicine Daily Progress Note    Date of Service  2/14/2023    [employer] Team: R IM Purple Team   Attending: Jesusita Trinidad M.d.  Senior Resident: Dr. Case  Intern:  Dr. Lieberman  Contact Number: [phone]    Chief Complaint  Virgen Boone is a 67 y.o. 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     Hospital Course  Virgen Boone is a 68 yo female w/ past medical history of hypertension and hyperlipidemia who presented 2/11/2023 for thrombocytopenia. She had a recent hospital admision at [facility] for 2 weeks after presenting with gingival petechia and petechiae over arms and legs. Of 2. 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 Platelets improved to 40K at discharge. Now she presents with recurrent thromcobytopenia in outpt labs, 12K. Patient admitted for further assessment and therapy, hematology eval. hep panel negative, LDH WNL and INR PTT WNL.  Heme-onc following, appreciate support. CT chest/abdomen/pelvis unremarkable. FRANCESCA currently pending.    Interval Problem Update  No acute events overnight, with vital signs stable.  Mild improvement noted in platelets to 23 (21).  FRANCESCA currently pending.  No current complaints. Patient denies cardiorespiratory symptoms and fever/chills. Also denies new bruising/bleeding. Current plan for bone marrow biopsy tomorrow (2/15).     I have discussed this patient's plan of care and discharge plan at IDT rounds today with Case Management, Nursing, Nursing leadership, and other members of the IDT team.    Consultants/Specialty  oncology    Code Status  Full Code    Disposition  Patient is not medically cleared for discharge.   Anticipate discharge to to home with close outpatient follow-up.  I have placed the appropriate orders for post-discharge needs.    Review of Systems  Review of Systems   Constitutional:  Negative for chills, diaphoresis, fever, malaise/fatigue and weight loss.   HENT:   Negative for congestion, ear pain, hearing loss, sinus pain and sore throat.    Eyes:  Negative for blurred vision and double vision.   Respiratory:  Negative for cough, hemoptysis, sputum production, shortness of breath and wheezing.    Cardiovascular:  Negative for chest pain, palpitations, orthopnea, claudication, leg swelling and PND.   Gastrointestinal:  Negative for abdominal pain, blood in stool, constipation, diarrhea, heartburn, melena, nausea and vomiting.   Genitourinary:  Negative for dysuria, flank pain, frequency, hematuria and urgency.   Musculoskeletal:  Negative for back pain, joint pain, myalgias and neck pain.   Skin:  Negative for itching and rash.   Neurological:  Positive for tingling and headaches (Brief headache morning of 2/13, resolved). Negative for dizziness, tremors (Transient, 1 month ago over the tongue and right leg. Spontaneous resolution.), sensory change, speech change, focal weakness, seizures, loss of consciousness and weakness.   Endo/Heme/Allergies:  Does not bruise/bleed easily.        Right arm with ecchymosis after IV acces/needle insertion but one of them was spontaneous.    Psychiatric/Behavioral: Negative.        Physical Exam  Temp:  [36.4 °C (97.5 °F)-36.8 °C (98.2 °F)] 36.4 °C (97.5 °F)  Pulse:  [63-90] 63  Resp:  [16-18] 16  BP: (101-132)/(63-88) 127/79  SpO2:  [93 %-96 %] 93 %    Physical Exam:  General: Awake, alert and oriented.  Head and Neck: NC/AT, EOMI, no scleral icterus or conjunctival pallor, no nasal discharge or oral erythema or exudates, very mild petechia over hard palate. Neck supple, has a submandibular mobile nontender <1cm LAD.  CV: Rhythmic heart sounds, no murmurs, gallops or rubs. No S3,S4 or JVD.   Pulm: Chest expansion is symmetrical, no crackles, rales, rhonchi, or wheezing.  GI: Flat, non distended, soft, nontender, normal bowel sounds.  Skin: Warm, no rashes, ecchymosis over right arm x3 up to 5cm.   MSK: Normal ROM, no joint swelling. No  lower extremity edema. No pain, no lesions.  Neuro: Patient is alert and oriented x3. Speech is clear and fluent. Is able to name, repeat and comprehend. Pupils equal, round and reactive to light. Extra ocular movements intact. Facial and body symmetry. Strength 5 out of 5 in upper and lower extremities. Sensitivity intact. Normal deep tendon reflexes. Normal tone. No gait abnormalities.   Psych: Appropriate mood and affect.      Fluids    Intake/Output Summary (Last 24 hours) at 2/14/2023 1039  Last data filed at 2/13/2023 2215  Gross per 24 hour   Intake 840 ml   Output --   Net 840 ml         Laboratory  Recent Labs     02/12/23  0431 02/13/23  0247 02/14/23  0519   WBC 10.9* 14.8* 9.3   RBC 3.64* 3.48* 3.39*   HEMOGLOBIN 11.1* 10.7* 10.4*   HEMATOCRIT 33.7* 32.2* 31.4*   MCV 92.6 92.5 92.6   MCH 30.5 30.7 30.7   MCHC 32.9* 33.2* 33.1*   RDW 48.9 49.4 50.2*   PLATELETCT 18* 21* 23*   MPV  --  14.0* 13.5*       Recent Labs     02/11/23  1626 02/12/23  0610 02/13/23  0247 02/14/23  0519   SODIUM 140 136 137  --    POTASSIUM 3.1* 3.1* 3.7  --    CHLORIDE 104 102 105  --    CO2 26 24 22  --    GLUCOSE 108* 204* 158* 100*   BUN 11 13 19  --    CREATININE 0.42* 0.53 0.57  --    CALCIUM 9.3 9.5 9.3  --        Recent Labs     02/11/23  1626   APTT 27.0   INR 0.99                 Imaging  CT-CHEST,ABDOMEN,PELVIS WITH   Final Result      1.  No evidence of lymphadenopathy in the chest, abdomen, or pelvis.   2.  No evidence of splenomegaly.   3.  No acute inflammatory process.      CT-HEAD W/O   Final Result      Head CT without contrast within normal limits. No evidence of acute cerebral infarction, hemorrhage or mass lesion.         IR-CONSULT AND TREAT    (Results Pending)          Assessment/Plan  Problem Representation:    * Severe thrombocytopenia (HCC)- (present on admission)  Assessment & Plan  Unknown baseline platelet count, subacute, presenting with oral and extremities petechia 1 month ago.  Mild anemia but no  signs of hemolysis (nl bili and LDH) and coagulation times within normal limits. No history of easy bruising/bleeding/clotting events.  No exposure to new medications or heparin. Recent hospitalization at [facility] 01/22/23 for 2 weeks for severe thrombocytopenia with platelets of 2.  There she received Eltrombopag, IVIG, Decadron, and was discharged 1 week ago on no therapy. Viral hepatitis panel and HIV negative.  Continue work-up, possible ITP. Patient is hemodynamically stable, no ongoing bleeding.  -Switched Solu-Medrol to prednisone 1 mg/kg (begun on 2/13) for 3 weeks followed by taper   -We will give additional IVIG if drop in platelets recurs, last dose 2/12  -CT abdomen/pelvis unremarkable  -Hematology following, considering BM tomorrow if platelets continue to improve  -FRANCESCA pending  -Begun on SSI while on high-dose steroids, will monitor for continued need  -We will begin Bactrim for PJP prophylaxis upon discharge  -We will likely need outpatient DEXA scan due to necessary long-term use of steroids  -Continue to monitor    Hypertension  Assessment & Plan  Controlled since admission.  -Continue to monitor         VTE prophylaxis: SCDs/TEDs    I have performed a physical exam and reviewed and updated ROS and Plan today (2/14/2023). In review of yesterday's note (2/13/2023), there are no changes except as documented above.

## 2023-02-14 NOTE — PROGRESS NOTES
[facility] Internal Medicine Daily Progress Note    Date of Service  2/13/2023    [employer] Team: R IM Purple Team   Attending: Jesusita Trinidad M.d.  Senior Resident: Dr. Case  Intern:  Dr. Lieberman  Contact Number: [phone]    Chief Complaint  Virgen Boone is a 67 y.o. 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     Hospital Course  Virgen Boone is a 66 yo female w/ past medical history of hypertension and hyperlipidemia who presented 2/11/2023 for thrombocytopenia. She had a recent hospital admision at [facility] for 2 weeks after presenting with gingival petechia and petechiae over arms and legs. Of 2. 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 Platelets improved to 40K at discharge. Now she presents with recurrent thromcobytopenia in outpt labs, 12K. Patient admitted for further assessment and therapy, hematology eval. hep panel negative, LDH WNL and INR PTT WNL.  Heme-onc following, appreciate support.    Interval Problem Update  No acute events overnight, with vital signs stable.  Mild improvement noted in platelets to 21 (18).  FRANCESCA currently pending.  CT chest abdomen pelvis unremarkable.  Patient only complaint was brief headache earlier in the a.m. of 2/13, otherwise no new complaints at this time.  Also does not note any new ecchymoses.    I have discussed this patient's plan of care and discharge plan at IDT rounds today with Case Management, Nursing, Nursing leadership, and other members of the IDT team.    Consultants/Specialty  oncology    Code Status  Full Code    Disposition  Patient is not medically cleared for discharge.   Anticipate discharge to to home with close outpatient follow-up.  I have placed the appropriate orders for post-discharge needs.    Review of Systems  Review of Systems   Constitutional:  Negative for chills, diaphoresis, fever, malaise/fatigue and weight loss.   HENT:  Negative for congestion, ear pain, hearing  loss, sinus pain and sore throat.    Eyes:  Negative for blurred vision and double vision.   Respiratory:  Negative for cough, hemoptysis, sputum production, shortness of breath and wheezing.    Cardiovascular:  Negative for chest pain, palpitations, orthopnea, claudication, leg swelling and PND.   Gastrointestinal:  Negative for abdominal pain, blood in stool, constipation, diarrhea, heartburn, melena, nausea and vomiting.   Genitourinary:  Negative for dysuria, flank pain, frequency, hematuria and urgency.   Musculoskeletal:  Negative for back pain, joint pain, myalgias and neck pain.   Skin:  Negative for itching and rash.   Neurological:  Positive for tingling and headaches (Brief headache morning of 2/13, resolved). Negative for dizziness, tremors (Transient, 1 month ago over the tongue and right leg. Spontaneous resolution.), sensory change, speech change, focal weakness, seizures, loss of consciousness and weakness.   Endo/Heme/Allergies:  Does not bruise/bleed easily.        Right arm with ecchymosis after IV acces/needle insertion but one of them was spontaneous.    Psychiatric/Behavioral: Negative.        Physical Exam  Temp:  [36.8 °C (98.2 °F)] 36.8 °C (98.2 °F)  Pulse:  [] 80  Resp:  [16-18] 16  BP: (105-134)/(76-88) 120/88  SpO2:  [93 %-96 %] 95 %      General: Awake, alert and oriented.  Head and Neck: NC/AT, EOMI, no scleral icterus or conjunctival pallor, no nasal discharge or oral erythema or exudates, very mild petechia over hard palate. Neck supple, has a submandibular mobile nontender <1cm LAD.  CV: Rhythmic heart sounds, no murmurs, gallops or rubs. No S3,S4 or JVD.   Pulm: Chest expansion is symmetrical, no crackles, rales, rhonchi, or wheezing.  GI: Flat, non distended, soft, nontender, normal bowel sounds.  Skin: Warm, no rashes, ecchymosis over right arm x3 up to 5cm.   MSK: Normal ROM, no joint swelling. No lower extremity edema. No pain, no lesions.  Neuro: Patient is alert and  oriented x3. Speech is clear and fluent. Is able to name, repeat and comprehend. Pupils equal, round and reactive to light. Extra ocular movements intact. Facial and body symmetry. Strength 5 out of 5 in upper and lower extremities. Sensitivity intact. Normal deep tendon reflexes. Normal tone. No gait abnormalities.   Psych: Appropriate mood and affect.      Fluids    Intake/Output Summary (Last 24 hours) at 2/13/2023 1647  Last data filed at 2/13/2023 1312  Gross per 24 hour   Intake 720 ml   Output --   Net 720 ml       Laboratory  Recent Labs     02/11/23  1626 02/12/23  0431 02/13/23  0247   WBC 8.1 10.9* 14.8*   RBC 3.85* 3.64* 3.48*   HEMOGLOBIN 11.8* 11.1* 10.7*   HEMATOCRIT 34.5* 33.7* 32.2*   MCV 89.6 92.6 92.5   MCH 30.6 30.5 30.7   MCHC 34.2 32.9* 33.2*   RDW 47.4 48.9 49.4   PLATELETCT 11* 18* 21*   MPV  --   --  14.0*     Recent Labs     02/11/23  1626 02/12/23  0610 02/13/23  0247   SODIUM 140 136 137   POTASSIUM 3.1* 3.1* 3.7   CHLORIDE 104 102 105   CO2 26 24 22   GLUCOSE 108* 204* 158*   BUN 11 13 19   CREATININE 0.42* 0.53 0.57   CALCIUM 9.3 9.5 9.3     Recent Labs     02/11/23  1626   APTT 27.0   INR 0.99               Imaging  CT-CHEST,ABDOMEN,PELVIS WITH   Final Result      1.  No evidence of lymphadenopathy in the chest, abdomen, or pelvis.   2.  No evidence of splenomegaly.   3.  No acute inflammatory process.      CT-HEAD W/O   Final Result      Head CT without contrast within normal limits. No evidence of acute cerebral infarction, hemorrhage or mass lesion.              Assessment/Plan  Problem Representation:    * Severe thrombocytopenia (HCC)- (present on admission)  Assessment & Plan  Unknown baseline platelet count, subacute, presenting with oral and extremities petechia 1 month ago.  Mild anemia but no signs of hemolysis (nl bili and LDH) and coagulation times within normal limits. No history of easy bruising/bleeding/clotting events.  No exposure to new medications or heparin. Recent  hospitalization at [facility] 01/22/23 for 2 weeks for severe thrombocytopenia with platelets of 2.  There she received Eltrombopag, IVIG, Decadron, and was discharged 1 week ago on no therapy. Viral hepatitis panel and HIV negative.  Continue work-up, possible ITP. Patient is hemodynamically stable, no ongoing bleeding.  -Switched Solu-Medrol to prednisone 1 mg/kg (begun on 2/13) for 3 weeks followed by taper   -We will give additional IVIG if drop in platelets recurs, last dose 2/12  -CT abdomen/pelvis unremarkable  -Hematology following, considering BM tomorrow if platelets continue to improve  -FRANCESCA pending  -Begun on SSI while on high-dose steroids, will monitor for continued need  -We will begin Bactrim for PJP prophylaxis upon discharge  -We will likely need outpatient DEXA scan due to necessary long-term use of steroids  -Continue to monitor    Hypertension  Assessment & Plan  Controlled since admission.  -Continue to monitor         VTE prophylaxis: SCDs/TEDs    I have performed a physical exam and reviewed and updated ROS and Plan today (2/13/2023). In review of yesterday's note (2/12/2023), there are no changes except as documented above.

## 2023-02-15 PROBLEM — R51.9 HEADACHE: Status: ACTIVE | Noted: 2023-02-15

## 2023-02-15 LAB
BASOPHILS # BLD AUTO: 0.1 % (ref 0–1.8)
BASOPHILS # BLD: 0.01 K/UL (ref 0–0.12)
EOSINOPHIL # BLD AUTO: 0.02 K/UL (ref 0–0.51)
EOSINOPHIL NFR BLD: 0.2 % (ref 0–6.9)
ERYTHROCYTE [DISTWIDTH] IN BLOOD BY AUTOMATED COUNT: 48.8 FL (ref 35.9–50)
GLUCOSE BLD STRIP.AUTO-MCNC: 102 MG/DL (ref 65–99)
GLUCOSE BLD STRIP.AUTO-MCNC: 164 MG/DL (ref 65–99)
GLUCOSE BLD STRIP.AUTO-MCNC: 85 MG/DL (ref 65–99)
GLUCOSE BLD STRIP.AUTO-MCNC: 89 MG/DL (ref 65–99)
HCT VFR BLD AUTO: 32.3 % (ref 37–47)
HGB BLD-MCNC: 10.8 G/DL (ref 12–16)
HGB RETIC QN AUTO: 35.6 PG/CELL (ref 29–35)
IMM GRANULOCYTES # BLD AUTO: 0.05 K/UL (ref 0–0.11)
IMM GRANULOCYTES NFR BLD AUTO: 0.5 % (ref 0–0.9)
IMM RETICS NFR: 18.3 % (ref 9.3–17.4)
INR PPP: 0.99 (ref 0.87–1.13)
LYMPHOCYTES # BLD AUTO: 2.37 K/UL (ref 1–4.8)
LYMPHOCYTES NFR BLD: 25.9 % (ref 22–41)
MCH RBC QN AUTO: 30.9 PG (ref 27–33)
MCHC RBC AUTO-ENTMCNC: 33.4 G/DL (ref 33.6–35)
MCV RBC AUTO: 92.6 FL (ref 81.4–97.8)
MONOCYTES # BLD AUTO: 0.43 K/UL (ref 0–0.85)
MONOCYTES NFR BLD AUTO: 4.7 % (ref 0–13.4)
NEUTROPHILS # BLD AUTO: 6.26 K/UL (ref 2–7.15)
NEUTROPHILS NFR BLD: 68.6 % (ref 44–72)
NRBC # BLD AUTO: 0 K/UL
NRBC BLD-RTO: 0 /100 WBC
NUCLEAR IGG SER QL IA: NORMAL
PATHOLOGY CONSULT NOTE: NORMAL
PLATELET # BLD AUTO: 28 K/UL (ref 164–446)
PMV BLD AUTO: 12.6 FL (ref 9–12.9)
PROTHROMBIN TIME: 13 SEC (ref 12–14.6)
RBC # BLD AUTO: 3.49 M/UL (ref 4.2–5.4)
RETICS # AUTO: 0.12 M/UL (ref 0.04–0.06)
RETICS/RBC NFR: 3.3 % (ref 0.8–2.1)
WBC # BLD AUTO: 9.1 K/UL (ref 4.8–10.8)

## 2023-02-15 PROCEDURE — 82962 GLUCOSE BLOOD TEST: CPT | Mod: 91

## 2023-02-15 PROCEDURE — 99233 SBSQ HOSP IP/OBS HIGH 50: CPT | Mod: GC | Performed by: HOSPITALIST

## 2023-02-15 PROCEDURE — 38222 DX BONE MARROW BX & ASPIR: CPT | Performed by: HOSPITALIST

## 2023-02-15 PROCEDURE — 99152 MOD SED SAME PHYS/QHP 5/>YRS: CPT | Performed by: HOSPITALIST

## 2023-02-15 PROCEDURE — 36415 COLL VENOUS BLD VENIPUNCTURE: CPT

## 2023-02-15 PROCEDURE — 85046 RETICYTE/HGB CONCENTRATE: CPT

## 2023-02-15 PROCEDURE — 160027 HCHG SURGERY MINUTES - 1ST 30 MINS LEVEL 2: Performed by: HOSPITALIST

## 2023-02-15 PROCEDURE — 85025 COMPLETE CBC W/AUTO DIFF WBC: CPT

## 2023-02-15 PROCEDURE — 700111 HCHG RX REV CODE 636 W/ 250 OVERRIDE (IP)

## 2023-02-15 PROCEDURE — 88305 TISSUE EXAM BY PATHOLOGIST: CPT | Mod: 59

## 2023-02-15 PROCEDURE — 160035 HCHG PACU - 1ST 60 MINS PHASE I: Performed by: HOSPITALIST

## 2023-02-15 PROCEDURE — 88342 IMHCHEM/IMCYTCHM 1ST ANTB: CPT

## 2023-02-15 PROCEDURE — 88311 DECALCIFY TISSUE: CPT

## 2023-02-15 PROCEDURE — 160048 HCHG OR STATISTICAL LEVEL 1-5: Performed by: HOSPITALIST

## 2023-02-15 PROCEDURE — 88341 IMHCHEM/IMCYTCHM EA ADD ANTB: CPT

## 2023-02-15 PROCEDURE — 88360 TUMOR IMMUNOHISTOCHEM/MANUAL: CPT

## 2023-02-15 PROCEDURE — 700111 HCHG RX REV CODE 636 W/ 250 OVERRIDE (IP): Performed by: HOSPITALIST

## 2023-02-15 PROCEDURE — 770020 HCHG ROOM/CARE - TELE (206)

## 2023-02-15 PROCEDURE — 160002 HCHG RECOVERY MINUTES (STAT): Performed by: HOSPITALIST

## 2023-02-15 PROCEDURE — 700102 HCHG RX REV CODE 250 W/ 637 OVERRIDE(OP)

## 2023-02-15 PROCEDURE — 88313 SPECIAL STAINS GROUP 2: CPT

## 2023-02-15 PROCEDURE — 85610 PROTHROMBIN TIME: CPT

## 2023-02-15 PROCEDURE — 07DR3ZX EXTRACTION OF ILIAC BONE MARROW, PERCUTANEOUS APPROACH, DIAGNOSTIC: ICD-10-PCS | Performed by: HOSPITALIST

## 2023-02-15 RX ORDER — MIDAZOLAM HYDROCHLORIDE 1 MG/ML
.5-2 INJECTION INTRAMUSCULAR; INTRAVENOUS PRN
Status: DISCONTINUED | OUTPATIENT
Start: 2023-02-15 | End: 2023-02-15 | Stop reason: HOSPADM

## 2023-02-15 RX ORDER — MIDAZOLAM HYDROCHLORIDE 1 MG/ML
INJECTION INTRAMUSCULAR; INTRAVENOUS
Status: DISPENSED
Start: 2023-02-15 | End: 2023-02-15

## 2023-02-15 RX ORDER — ACETAMINOPHEN 325 MG/1
650 TABLET ORAL EVERY 4 HOURS PRN
Status: DISCONTINUED | OUTPATIENT
Start: 2023-02-15 | End: 2023-02-17 | Stop reason: HOSPADM

## 2023-02-15 RX ORDER — SODIUM CHLORIDE 9 MG/ML
500 INJECTION, SOLUTION INTRAVENOUS
Status: DISCONTINUED | OUTPATIENT
Start: 2023-02-15 | End: 2023-02-15 | Stop reason: HOSPADM

## 2023-02-15 RX ADMIN — INSULIN LISPRO 1 UNITS: 100 INJECTION, SOLUTION INTRAVENOUS; SUBCUTANEOUS at 16:50

## 2023-02-15 RX ADMIN — PREDNISONE 70 MG: 20 TABLET ORAL at 06:26

## 2023-02-15 ASSESSMENT — ENCOUNTER SYMPTOMS
WHEEZING: 0
FLANK PAIN: 0
FEVER: 0
BACK PAIN: 0
HEADACHES: 1
PND: 0
SHORTNESS OF BREATH: 0
ABDOMINAL PAIN: 0
CHILLS: 0
SINUS PAIN: 0
SPUTUM PRODUCTION: 0
CLAUDICATION: 0
FOCAL WEAKNESS: 0
DIARRHEA: 0
COUGH: 0
NAUSEA: 0
PSYCHIATRIC NEGATIVE: 1
PALPITATIONS: 0
DIZZINESS: 0
SEIZURES: 0
BRUISES/BLEEDS EASILY: 0
WEIGHT LOSS: 0
TINGLING: 1
LOSS OF CONSCIOUSNESS: 0
TREMORS: 0
ORTHOPNEA: 0
CONSTIPATION: 0
DOUBLE VISION: 0
MYALGIAS: 0
SORE THROAT: 0
SPEECH CHANGE: 0
HEARTBURN: 0
DIAPHORESIS: 0
SENSORY CHANGE: 0
HEMOPTYSIS: 0
VOMITING: 0
WEAKNESS: 0
BLOOD IN STOOL: 0
BLURRED VISION: 0
NECK PAIN: 0

## 2023-02-15 ASSESSMENT — PAIN DESCRIPTION - PAIN TYPE
TYPE: SURGICAL PAIN

## 2023-02-15 NOTE — OR NURSING
1140 Arrived from Endo awake, denies pain, denies nausea. Vss.     1201 Left hip band aid clean dry intact. Vss.     1210 Criteria met to discharge patient out of recovery.     1214 Report called to Liliana GO all questions answered.     1241 Patient back to room monitored with all personal belongings. Care transferred to receiving DONAVAN Ford

## 2023-02-15 NOTE — PROGRESS NOTES
Monitor Summary:     Rhythm: SR  Rate: 60-84  Ectopy: None  Measurements: .18/.07/.40           12 Hour Chart Check

## 2023-02-15 NOTE — PROCEDURES
Bone Marrow Biopsy/Aspiration    Date/Time: 2/15/2023 11:18 AM  Performed by: Luiz Osorio M.D.  Authorized by: Luiz Osorio M.D.     Consent:     Consent obtained:  Written    Consent given by:  Patient    Risks discussed:  Bleeding, infection, pain and nerve damage    Alternatives discussed:  Delayed treatment  Pre-procedure details:     Procedure type:  Aspiration and biopsy    Requesting physician:  Dr OMARI Pagan MD    Indications:  Thrombocytopenia    Position:  Prone    Buttock laterality:  Left    Local anesthetic:  1% Lidocaine    Subcutaneous volume:  1 mL    Periosteum anesthetic volume:  4 mL    Preparation: Patient was prepped and draped in usual sterile fashion    Sedation:     Patient Sedated: Yes      Sedation type: moderate (conscious) sedation      Sedation:  Midazolam (2 mg IV)    Analgesia:  Fentanyl (50 mcg IV)    Sedation length:  15 minutes  Procedure details:     Aspirate obtained:  5 mL followed by 5 mL    Biopsy performed:  1 core    Number of attempts:  1    Estimated blood loss (mL):  0  Post-procedure:     Puncture site:  Adhesive bandage applied    Patient tolerance of procedure:  Tolerated well, no immediate complications

## 2023-02-15 NOTE — ASSESSMENT & PLAN NOTE
Patient had brief and mild occipital headache on 2/13 which resolved, and no FND's were noted.  Recurrent mild occipital headache noted on 2/15 morning which transition to mild right frontal headache.  Again, no FND's noted with patient ANO x4.  Of note CT head on 2/11 unremarkable.  Low concern for intracranial bleed  -Continue to monitor  -We will obtain stat CT head if FND's/AMS occurs

## 2023-02-15 NOTE — CARE PLAN
The patient is Stable - Low risk of patient condition declining or worsening    Shift Goals  Clinical Goals: Monitor Platelets, Bone Marrow Biopsy Prep  Patient Goals: Comfort, DC home  Family Goals: fahad    Progress made toward(s) clinical / shift goals:        Problem: Fluid Volume  Goal: Fluid volume balance will be maintained  Outcome: Progressing     Problem: Hemodynamics  Goal: Patient's hemodynamics, fluid balance and neurologic status will be stable or improve  Outcome: Progressing     Problem: Fall Risk  Goal: Patient will remain free from falls  Outcome: Progressing       Patient is not progressing towards the following goals:

## 2023-02-15 NOTE — PROGRESS NOTES
Report received from night shift RN, assumed patient care. Patient resting comfortably in bed, no signs of distress. Patient's bed is in lowest position, call light and belongings in reach.

## 2023-02-15 NOTE — PROGRESS NOTES
[facility] Internal Medicine Daily Progress Note    Date of Service  2/15/2023    [employer] Team: R IM Purple Team   Attending: Jesusita Trinidad M.d.  Senior Resident: Dr. Case  Intern:  Dr. Lieberman  Contact Number: [phone]    Chief Complaint  Virgen Boone is a 67 y.o. 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     Hospital Course  Virgen Boone is a 68 yo female w/ past medical history of hypertension and hyperlipidemia who presented 2/11/2023 for thrombocytopenia. She had a recent hospital admision at [facility] for 2 weeks after presenting with gingival petechia and petechiae over arms and legs. Of 2. 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 Platelets improved to 40K at discharge. Now she presents with recurrent thromcobytopenia in outpt labs, 12K. Patient admitted for further assessment and therapy, hematology eval. hep panel negative, LDH WNL and INR PTT WNL.  Heme-onc following, appreciate support. CT chest/abdomen/pelvis unremarkable.     Interval Problem Update  No acute events overnight, with vital signs stable.  Mild improvement noted in platelets to 28 (23).  FRANCESCA not detectable.  Patient has had intermittent headache this morning, similar to headache on the morning of 2/13.  No FND's noted with negative CT head on 2/11.  Patient denies cardiorespiratory symptoms and fever/chills. Also denies new bruising/bleeding. Current plan for bone marrow biopsy today (2/15).     I have discussed this patient's plan of care and discharge plan at IDT rounds today with Case Management, Nursing, Nursing leadership, and other members of the IDT team.    Consultants/Specialty  oncology    Code Status  Full Code    Disposition  Patient is not medically cleared for discharge.   Anticipate discharge to to home with close outpatient follow-up.  I have placed the appropriate orders for post-discharge needs.    Review of Systems  Review of Systems    Constitutional:  Negative for chills, diaphoresis, fever, malaise/fatigue and weight loss.   HENT:  Negative for congestion, ear pain, hearing loss, sinus pain and sore throat.    Eyes:  Negative for blurred vision and double vision.   Respiratory:  Negative for cough, hemoptysis, sputum production, shortness of breath and wheezing.    Cardiovascular:  Negative for chest pain, palpitations, orthopnea, claudication, leg swelling and PND.   Gastrointestinal:  Negative for abdominal pain, blood in stool, constipation, diarrhea, heartburn, melena, nausea and vomiting.   Genitourinary:  Negative for dysuria, flank pain, frequency, hematuria and urgency.   Musculoskeletal:  Negative for back pain, joint pain, myalgias and neck pain.   Skin:  Negative for itching and rash.   Neurological:  Positive for tingling and headaches (Brief intermittent headaches). Negative for dizziness, tremors (Transient, 1 month ago over the tongue and right leg. Spontaneous resolution.), sensory change, speech change, focal weakness, seizures, loss of consciousness and weakness.   Endo/Heme/Allergies:  Does not bruise/bleed easily.        Right arm with ecchymosis after IV acces/needle insertion but one of them was spontaneous.    Psychiatric/Behavioral: Negative.        Physical Exam  Temp:  [35.9 °C (96.6 °F)-36.8 °C (98.2 °F)] 36.2 °C (97.1 °F)  Pulse:  [61-84] 64  Resp:  [16-41] 19  BP: ()/(61-90) 99/68  SpO2:  [91 %-96 %] 96 %    Physical Exam:  General: Awake, alert and oriented.  Head and Neck: NC/AT, EOMI, no scleral icterus or conjunctival pallor, no nasal discharge or oral erythema or exudates, very mild petechia over hard palate. Neck supple, has a submandibular mobile nontender <1cm LAD.  CV: Rhythmic heart sounds, no murmurs, gallops or rubs. No S3,S4 or JVD.   Pulm: Chest expansion is symmetrical, no crackles, rales, rhonchi, or wheezing.  GI: Flat, non distended, soft, nontender, normal bowel sounds.  Skin: Warm, no  rashes, ecchymosis over right arm x3 up to 5cm.   MSK: Normal ROM, no joint swelling. No lower extremity edema. No pain, no lesions.  Neuro: Patient is alert and oriented x3. Speech is clear and fluent. Is able to name, repeat and comprehend. Pupils equal, round and reactive to light. Extra ocular movements intact. Facial and body symmetry. Strength 5 out of 5 in upper and lower extremities. Sensitivity intact. Normal deep tendon reflexes. Normal tone. No gait abnormalities.   Psych: Appropriate mood and affect.      Fluids    Intake/Output Summary (Last 24 hours) at 2/15/2023 1211  Last data filed at 2/14/2023 2000  Gross per 24 hour   Intake 360 ml   Output --   Net 360 ml       Laboratory  Recent Labs     02/13/23  0247 02/14/23  0519 02/15/23  0212   WBC 14.8* 9.3 9.1   RBC 3.48* 3.39* 3.49*   HEMOGLOBIN 10.7* 10.4* 10.8*   HEMATOCRIT 32.2* 31.4* 32.3*   MCV 92.5 92.6 92.6   MCH 30.7 30.7 30.9   MCHC 33.2* 33.1* 33.4*   RDW 49.4 50.2* 48.8   PLATELETCT 21* 23* 28*   MPV 14.0* 13.5* 12.6     Recent Labs     02/13/23 0247 02/14/23 0519   SODIUM 137  --    POTASSIUM 3.7  --    CHLORIDE 105  --    CO2 22  --    GLUCOSE 158* 100*   BUN 19  --    CREATININE 0.57  --    CALCIUM 9.3  --      Recent Labs     02/15/23  0212   INR 0.99               Imaging  CT-CHEST,ABDOMEN,PELVIS WITH   Final Result      1.  No evidence of lymphadenopathy in the chest, abdomen, or pelvis.   2.  No evidence of splenomegaly.   3.  No acute inflammatory process.      CT-HEAD W/O   Final Result      Head CT without contrast within normal limits. No evidence of acute cerebral infarction, hemorrhage or mass lesion.                Assessment/Plan  Problem Representation:    * Severe thrombocytopenia (HCC)- (present on admission)  Assessment & Plan  Unknown baseline platelet count, subacute, presenting with oral and extremities petechia 1 month ago.  Mild anemia but no signs of hemolysis (nl bili and LDH) and coagulation times within normal  limits. No history of easy bruising/bleeding/clotting events.  No exposure to new medications or heparin. Recent hospitalization at [facility] 01/22/23 for 2 weeks for severe thrombocytopenia with platelets of 2.  There she received Eltrombopag, IVIG, Decadron, and was discharged 1 week ago on no therapy. Viral hepatitis panel and HIV negative.  Continue work-up, possible ITP. Patient is hemodynamically stable, no ongoing bleeding. CT chest/abdomen/pelvis unremarkable.  FRANCESCA not detectable.  Heme/onc following, appreciate recommendations.  -Switched Solu-Medrol to prednisone 1 mg/kg (begun on 2/13) for 3 weeks followed by taper   -We will give additional IVIG if drop in platelets recurs, last dose 2/12  -Plan for bone marrow biopsy on 2/15  -Begun on SSI while on high-dose steroids on 2/13, will monitor for continued need  -We will begin Bactrim for PJP prophylaxis upon discharge  -We will likely need outpatient DEXA scan due to necessary long-term use of steroids  -Continue to monitor    Headache  Assessment & Plan  Patient had brief and mild occipital headache on 2/13 which resolved, and no FND's were noted.  Recurrent mild occipital headache noted on 2/15 morning which transition to mild right frontal headache.  Again, no FND's noted with patient ANO x4.  Of note CT head on 2/11 unremarkable.  Low concern for intracranial bleed  -Continue to monitor  -We will obtain stat CT head if FND's/AMS occurs    Hypertension  Assessment & Plan  Controlled since admission.  -Continue to monitor         VTE prophylaxis: SCDs/TEDs    I have performed a physical exam and reviewed and updated ROS and Plan today (2/15/2023). In review of yesterday's note (2/14/2023), there are no changes except as documented above.

## 2023-02-16 LAB
BASOPHILS # BLD AUTO: 0.2 % (ref 0–1.8)
BASOPHILS # BLD: 0.02 K/UL (ref 0–0.12)
EOSINOPHIL # BLD AUTO: 0.02 K/UL (ref 0–0.51)
EOSINOPHIL NFR BLD: 0.2 % (ref 0–6.9)
ERYTHROCYTE [DISTWIDTH] IN BLOOD BY AUTOMATED COUNT: 50.2 FL (ref 35.9–50)
GLUCOSE BLD STRIP.AUTO-MCNC: 119 MG/DL (ref 65–99)
GLUCOSE BLD STRIP.AUTO-MCNC: 84 MG/DL (ref 65–99)
GLUCOSE BLD STRIP.AUTO-MCNC: 86 MG/DL (ref 65–99)
GLUCOSE BLD STRIP.AUTO-MCNC: 86 MG/DL (ref 65–99)
HCT VFR BLD AUTO: 36 % (ref 37–47)
HGB BLD-MCNC: 11.9 G/DL (ref 12–16)
IMM GRANULOCYTES # BLD AUTO: 0.06 K/UL (ref 0–0.11)
IMM GRANULOCYTES NFR BLD AUTO: 0.7 % (ref 0–0.9)
LYMPHOCYTES # BLD AUTO: 2.25 K/UL (ref 1–4.8)
LYMPHOCYTES NFR BLD: 27.5 % (ref 22–41)
MCH RBC QN AUTO: 31.2 PG (ref 27–33)
MCHC RBC AUTO-ENTMCNC: 33.1 G/DL (ref 33.6–35)
MCV RBC AUTO: 94.5 FL (ref 81.4–97.8)
MONOCYTES # BLD AUTO: 0.38 K/UL (ref 0–0.85)
MONOCYTES NFR BLD AUTO: 4.6 % (ref 0–13.4)
NEUTROPHILS # BLD AUTO: 5.45 K/UL (ref 2–7.15)
NEUTROPHILS NFR BLD: 66.8 % (ref 44–72)
NRBC # BLD AUTO: 0.02 K/UL
NRBC BLD-RTO: 0.2 /100 WBC
PLATELET # BLD AUTO: 49 K/UL (ref 164–446)
PMV BLD AUTO: 12.8 FL (ref 9–12.9)
RBC # BLD AUTO: 3.81 M/UL (ref 4.2–5.4)
WBC # BLD AUTO: 8.2 K/UL (ref 4.8–10.8)

## 2023-02-16 PROCEDURE — 82962 GLUCOSE BLOOD TEST: CPT | Mod: 91

## 2023-02-16 PROCEDURE — 700111 HCHG RX REV CODE 636 W/ 250 OVERRIDE (IP)

## 2023-02-16 PROCEDURE — 85025 COMPLETE CBC W/AUTO DIFF WBC: CPT

## 2023-02-16 PROCEDURE — 99233 SBSQ HOSP IP/OBS HIGH 50: CPT | Mod: GC | Performed by: HOSPITALIST

## 2023-02-16 PROCEDURE — 770001 HCHG ROOM/CARE - MED/SURG/GYN PRIV*

## 2023-02-16 RX ADMIN — PREDNISONE 70 MG: 20 TABLET ORAL at 06:00

## 2023-02-16 ASSESSMENT — ENCOUNTER SYMPTOMS
BLURRED VISION: 0
HEARTBURN: 0
CONSTIPATION: 0
LOSS OF CONSCIOUSNESS: 0
BACK PAIN: 0
PSYCHIATRIC NEGATIVE: 1
FOCAL WEAKNESS: 0
WEAKNESS: 0
TINGLING: 1
ABDOMINAL PAIN: 0
WHEEZING: 0
DEPRESSION: 0
TREMORS: 0
MYALGIAS: 0
SORE THROAT: 0
HEADACHES: 0
PALPITATIONS: 0
COUGH: 0
SPUTUM PRODUCTION: 0
NAUSEA: 0
BLOOD IN STOOL: 0
NECK PAIN: 0
SEIZURES: 0
VOMITING: 0
BRUISES/BLEEDS EASILY: 0
WEIGHT LOSS: 0
FEVER: 0
SINUS PAIN: 0
CHILLS: 0
PHOTOPHOBIA: 0
SHORTNESS OF BREATH: 0
SPEECH CHANGE: 0
DOUBLE VISION: 0
PND: 0
ORTHOPNEA: 0
CLAUDICATION: 0
DIZZINESS: 0
FLANK PAIN: 0
DIAPHORESIS: 0
DIARRHEA: 0
SENSORY CHANGE: 0
HEMOPTYSIS: 0

## 2023-02-16 ASSESSMENT — COGNITIVE AND FUNCTIONAL STATUS - GENERAL
SUGGESTED CMS G CODE MODIFIER DAILY ACTIVITY: CH
DAILY ACTIVITIY SCORE: 24
MOBILITY SCORE: 24
SUGGESTED CMS G CODE MODIFIER MOBILITY: CH

## 2023-02-16 ASSESSMENT — PATIENT HEALTH QUESTIONNAIRE - PHQ9
SUM OF ALL RESPONSES TO PHQ QUESTIONS 1-9: 7
7. TROUBLE CONCENTRATING ON THINGS, SUCH AS READING THE NEWSPAPER OR WATCHING TELEVISION: SEVERAL DAYS
2. FEELING DOWN, DEPRESSED, IRRITABLE, OR HOPELESS: MORE THAN HALF THE DAYS
9. THOUGHTS THAT YOU WOULD BE BETTER OFF DEAD, OR OF HURTING YOURSELF: SEVERAL DAYS
4. FEELING TIRED OR HAVING LITTLE ENERGY: NOT AT ALL
6. FEELING BAD ABOUT YOURSELF - OR THAT YOU ARE A FAILURE OR HAVE LET YOURSELF OR YOUR FAMILY DOWN: NOT AL ALL
5. POOR APPETITE OR OVEREATING: NOT AT ALL
3. TROUBLE FALLING OR STAYING ASLEEP OR SLEEPING TOO MUCH: SEVERAL DAYS
8. MOVING OR SPEAKING SO SLOWLY THAT OTHER PEOPLE COULD HAVE NOTICED. OR THE OPPOSITE, BEING SO FIGETY OR RESTLESS THAT YOU HAVE BEEN MOVING AROUND A LOT MORE THAN USUAL: NOT AT ALL
SUM OF ALL RESPONSES TO PHQ9 QUESTIONS 1 AND 2: 4
1. LITTLE INTEREST OR PLEASURE IN DOING THINGS: MORE THAN HALF THE DAYS

## 2023-02-16 ASSESSMENT — FIBROSIS 4 INDEX: FIB4 SCORE: 6.06

## 2023-02-16 NOTE — PROGRESS NOTES
Received bedside report from DONAVAN Ford, pt care assumed. VS WDL, pt assessment complete. Pt A&Ox4, no c/o pain at this time. POC discussed with pt and verbalizes no questions. Pt denies any additional needs at this time. Bed locked and in lowest position, bed alarm off as client is up to self. Pt educated on fall risk and verbalized understanding, call light within reach, hourly rounding initiated.

## 2023-02-16 NOTE — PROGRESS NOTES
Oncology/Hematology Progress Note               Author: Finn Pagan M.D. Date & Time created: 2/16/2023  8:12 AM     Thrombocytopenia  Interval History:  No new changes overnight.  I have her daughter on the telephone for translation.    Review of Systems:  Review of Systems   Constitutional:  Positive for malaise/fatigue. Negative for chills, fever and weight loss.   HENT:  Positive for nosebleeds. Negative for hearing loss and tinnitus.    Eyes:  Negative for blurred vision, double vision and photophobia.   Respiratory:  Negative for cough, hemoptysis and sputum production.    Cardiovascular:  Negative for chest pain, palpitations and orthopnea.   Gastrointestinal:  Negative for abdominal pain, heartburn, nausea and vomiting.   Genitourinary:  Negative for dysuria and urgency.   Neurological:  Negative for dizziness.   Endo/Heme/Allergies: Negative.  Negative for environmental allergies. Does not bruise/bleed easily.   Psychiatric/Behavioral:  Negative for depression.      Physical Exam:  Physical Exam  Constitutional:       Appearance: Normal appearance.   Eyes:      Extraocular Movements: Extraocular movements intact.      Pupils: Pupils are equal, round, and reactive to light.   Cardiovascular:      Rate and Rhythm: Normal rate and regular rhythm.   Pulmonary:      Effort: Pulmonary effort is normal.      Breath sounds: Normal breath sounds.   Abdominal:      General: Bowel sounds are normal. There is no distension.   Musculoskeletal:         General: No swelling.   Skin:     Coloration: Skin is not jaundiced.   Neurological:      General: No focal deficit present.      Mental Status: She is alert and oriented to person, place, and time.   Psychiatric:         Mood and Affect: Mood normal.         Behavior: Behavior normal.         Thought Content: Thought content normal.         Judgment: Judgment normal.       Labs:          No results for input(s): SODIUM, POTASSIUM, CHLORIDE, CO2, BUN, CREATININE,  MAGNESIUM, PHOSPHORUS, CALCIUM in the last 72 hours.  Recent Labs     23  0519   GLUCOSE 100*     Recent Labs     23  0519 02/15/23  0212 02/16/23  014   RBC 3.39* 3.49* 3.81*   HEMOGLOBIN 10.4* 10.8* 11.9*   HEMATOCRIT 31.4* 32.3* 36.0*   PLATELETCT 23* 28* 49*   PROTHROMBTM  --  13.0  --    INR  --  0.99  --      Recent Labs     02/14/23  0519 02/15/23  0212 02/16/23  014   WBC 9.3 9.1 8.2   NEUTSPOLYS 69.00 68.60 66.80   LYMPHOCYTES 25.30 25.90 27.50   MONOCYTES 5.10 4.70 4.60   EOSINOPHILS 0.10 0.20 0.20   BASOPHILS 0.10 0.10 0.20     Recent Labs     2319   GLUCOSE 100*     Hemodynamics:  Temp (24hrs), Av.4 °C (97.6 °F), Min:36.2 °C (97.1 °F), Max:36.7 °C (98.1 °F)  Temperature: 36.7 °C (98.1 °F), Monitored Temp: 36.4 °C (97.5 °F)  Pulse  Av.2  Min: 57  Max: 107   Blood Pressure : 135/88     Respiratory:    Respiration: 17, Pulse Oximetry: 92 %           Fluids:    Intake/Output Summary (Last 24 hours) at 2023 0812  Last data filed at 2023 0603  Gross per 24 hour   Intake 240 ml   Output --   Net 240 ml        GI/Nutrition:  Orders Placed This Encounter   Procedures    Diet Order Diet: Regular     Standing Status:   Standing     Number of Occurrences:   1     Order Specific Question:   Diet:     Answer:   Regular [1]     Medical Decision Making, by Problem:  Active Hospital Problems    Diagnosis     *Severe thrombocytopenia (HCC) [D69.6]     Headache [R51.9]     Hypertension [I10]     Hyperlipidemia [E78.5]     Hypokalemia [E87.6]        Plan:     Thrombocytopenia since 2023 (primary versus secondary still under work-up)  Hypertension history  Mild normocytic anemia may be delusional since she was normal when she came in with an H&H of 14 and 41.  Leukocytosis secondary to steroids     Treatment     IVIG 1 g/kg finished on 2023  IV Solu-Medrol changed to prednisone 70 mg     Laboratories     Normal LDH, mild elevation in ALT, negative hepatitis panel,  negative HIV, normal bilirubin, coags normal     FRANCESCA pending       Plan for 2/16/2023    --Her platelets are slowly coming up to 49,000.  I think she is starting to be at a safe platelet count where she can be discharged from the hospital when medically ready.  I discussed with her and her daughter that the primary team will decide that.  She obviously needs her blood counts watch closely.  We will follow-up with the bone marrow as an outpatient.  They live in [place].  They would prefer to go to our [place] clinic that recently open.  Dr. Streeter.  I have already relayed this to our office.  They will be getting the patient appointment in [place] next week.    --The patient should be kept on her prednisone.  She should have a PPI.  She will need blood work done at least on Monday if discharged.    --She will need follow-up with her primary care physician as well    --Education on her sugars if insulin is really required.      -- I do not think she needs any other antibiotics for prophylaxis at this time since we will be weaning her steroids over the next 3 to 4 weeks.    High complexity/complex decision making/extensive discussion/coordination of care    Quality-Core Measures   Reviewed items::  Labs reviewed, Medications reviewed and Radiology images reviewed

## 2023-02-16 NOTE — CARE PLAN
The patient is Stable - Low risk of patient condition declining or worsening    Shift Goals  Clinical Goals: VSS  Patient Goals: results  Family Goals: LOPEZ- no family present    Progress made toward(s) clinical / shift goals:    Problem: Knowledge Deficit - Standard  Goal: Patient and family/care givers will demonstrate understanding of plan of care, disease process/condition, diagnostic tests and medications  Outcome: Progressing     Problem: Risk for Bleeding  Goal: Patient will take measures to prevent bleeding and recognizes signs of bleeding that need to be reported immediately to a health care professional  Outcome: Progressing     Problem: Fall Risk  Goal: Patient will remain free from falls  Outcome: Progressing       Patient is not progressing towards the following goals:

## 2023-02-16 NOTE — CARE PLAN
The patient is Stable - Low risk of patient condition declining or worsening    Shift Goals  Clinical Goals: monitor platelet levels, complete bone marrow biopsy  Patient Goals: comfort, bone marrow biopsy  Family Goals: LOPEZ    Progress made toward(s) clinical / shift goals:      Problem: Knowledge Deficit - Standard  Goal: Patient and family/care givers will demonstrate understanding of plan of care, disease process/condition, diagnostic tests and medications  Outcome: Progressing     Problem: Risk for Bleeding  Goal: Patient will take measures to prevent bleeding and recognizes signs of bleeding that need to be reported immediately to a health care professional  Outcome: Progressing  Goal: Patient will not experience bleeding as evidenced by normal blood pressure, stable hematocrit and hemoglobin levels and desired ranges for coagulation profiles  Outcome: Progressing     Problem: Fall Risk  Goal: Patient will remain free from falls  Outcome: Progressing       Patient is not progressing towards the following goals:

## 2023-02-17 ENCOUNTER — PHARMACY VISIT (OUTPATIENT)
Dept: PHARMACY | Facility: MEDICAL CENTER | Age: 68
End: 2023-02-17
Payer: COMMERCIAL

## 2023-02-17 VITALS
DIASTOLIC BLOOD PRESSURE: 76 MMHG | HEIGHT: 60 IN | OXYGEN SATURATION: 96 % | SYSTOLIC BLOOD PRESSURE: 122 MMHG | TEMPERATURE: 97.6 F | HEART RATE: 75 BPM | RESPIRATION RATE: 16 BRPM | BODY MASS INDEX: 32.16 KG/M2 | WEIGHT: 163.8 LBS

## 2023-02-17 PROBLEM — E87.6 HYPOKALEMIA: Status: RESOLVED | Noted: 2023-02-11 | Resolved: 2023-02-17

## 2023-02-17 PROBLEM — R51.9 HEADACHE: Status: RESOLVED | Noted: 2023-02-15 | Resolved: 2023-02-17

## 2023-02-17 PROBLEM — I10 HYPERTENSION: Status: RESOLVED | Noted: 2023-02-11 | Resolved: 2023-02-17

## 2023-02-17 PROCEDURE — 700111 HCHG RX REV CODE 636 W/ 250 OVERRIDE (IP)

## 2023-02-17 PROCEDURE — RXMED WILLOW AMBULATORY MEDICATION CHARGE

## 2023-02-17 PROCEDURE — 99239 HOSP IP/OBS DSCHRG MGMT >30: CPT | Mod: GC | Performed by: HOSPITALIST

## 2023-02-17 RX ORDER — SULFAMETHOXAZOLE AND TRIMETHOPRIM 800; 160 MG/1; MG/1
1 TABLET ORAL
Qty: 8 TABLET | Refills: 0 | Status: ACTIVE | OUTPATIENT
Start: 2023-02-17 | End: 2023-02-17

## 2023-02-17 RX ORDER — OMEPRAZOLE 20 MG/1
20 CAPSULE, DELAYED RELEASE ORAL DAILY
Qty: 30 CAPSULE | Refills: 0 | Status: SHIPPED | OUTPATIENT
Start: 2023-02-17

## 2023-02-17 RX ORDER — PREDNISONE 1 MG/1
TABLET ORAL
Qty: 30 TABLET | Refills: 0 | Status: SHIPPED | OUTPATIENT
Start: 2023-02-18 | End: 2023-02-17 | Stop reason: SDUPTHER

## 2023-02-17 RX ORDER — SULFAMETHOXAZOLE AND TRIMETHOPRIM 800; 160 MG/1; MG/1
1 TABLET ORAL
Qty: 12 TABLET | Refills: 0 | Status: ACTIVE | OUTPATIENT
Start: 2023-02-17 | End: 2023-02-17 | Stop reason: SDUPTHER

## 2023-02-17 RX ORDER — PREDNISONE 10 MG/1
70 TABLET ORAL EVERY MORNING
Qty: 98 TABLET | Refills: 0 | Status: SHIPPED | OUTPATIENT
Start: 2023-02-17 | End: 2023-03-03

## 2023-02-17 RX ORDER — PREDNISONE 10 MG/1
TABLET ORAL
Qty: 98 TABLET | Refills: 0 | Status: SHIPPED | OUTPATIENT
Start: 2023-02-17 | End: 2023-02-17 | Stop reason: SDUPTHER

## 2023-02-17 RX ORDER — PREDNISONE 5 MG/1
2.5 TABLET ORAL EVERY MORNING
Qty: 3 TABLET | Refills: 0 | Status: SHIPPED | OUTPATIENT
Start: 2023-03-12 | End: 2023-02-17

## 2023-02-17 RX ADMIN — PREDNISONE 70 MG: 20 TABLET ORAL at 06:00

## 2023-02-17 ASSESSMENT — PAIN DESCRIPTION - PAIN TYPE: TYPE: ACUTE PAIN;CHRONIC PAIN

## 2023-02-17 NOTE — PROGRESS NOTES
2 RN Skin Check    4 Eyes Skin Assessment Completed by Renee GO and DONAVAN Sidhu.    Head WDL  Ears WDL  Nose WDL  Mouth WDL  Neck WDL  Breast/Chest WDL  Shoulder Blades WDL  Spine WDL  (R) Arm/Elbow/Hand WDL  (L) Arm/Elbow/Hand WDL  Abdomen WDL  Groin WDL  Scrotum/Coccyx/Buttocks WDL  (R) Leg WDL  (L) Leg WDL  (R) Heel/Foot/Toe WDL  (L) Heel/Foot/Toe WDL          Devices In Places N/A      Interventions In Place N/A    Possible Skin Injury NO    Pictures Uploaded Into Epic N/A  Wound Consult Placed N/A  RN Wound Prevention Protocol Ordered No

## 2023-02-17 NOTE — PROGRESS NOTES
[facility] Internal Medicine Daily Progress Note    Date of Service  2/16/2023    [employer] Team: R IM Purple Team   Attending: Jesusita Trinidad M.d.  Senior Resident: Dr. Case  Intern:  Dr. Lieberman  Contact Number: [phone]    Chief Complaint  Virgen Boone is a 67 y.o. 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     Hospital Course  Virgen Boone is a 66 yo female w/ past medical history of hypertension and hyperlipidemia who presented 2/11/2023 for thrombocytopenia. She had a recent hospital admision at [facility] for 2 weeks after presenting with gingival petechia and petechiae over arms and legs. Of 2. 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 Platelets improved to 40K at discharge. Now she presents with recurrent thromcobytopenia in outpt labs, 12K. Patient admitted for further assessment and therapy, hematology eval. hep panel negative, LDH WNL and INR PTT WNL.  Heme-onc following, appreciate support. CT chest/abdomen/pelvis unremarkable.     Interval Problem Update  No acute events overnight, with vital signs stable. Platelet improvement from 28 to 49. Denies any headache. Bone marrow biopsy performed yesterday.    I have discussed this patient's plan of care and discharge plan at IDT rounds today with Case Management, Nursing, Nursing leadership, and other members of the IDT team.    Consultants/Specialty  oncology    Code Status  Full Code    Disposition  Patient is not medically cleared for discharge.   Anticipate discharge to to home with close outpatient follow-up.  I have placed the appropriate orders for post-discharge needs.    Review of Systems  Review of Systems   Constitutional:  Negative for chills, diaphoresis, fever, malaise/fatigue and weight loss.   HENT:  Negative for congestion, ear pain, hearing loss, sinus pain and sore throat.    Eyes:  Negative for blurred vision and double vision.   Respiratory:  Negative for cough,  hemoptysis, sputum production, shortness of breath and wheezing.    Cardiovascular:  Negative for chest pain, palpitations, orthopnea, claudication, leg swelling and PND.   Gastrointestinal:  Negative for abdominal pain, blood in stool, constipation, diarrhea, heartburn, melena, nausea and vomiting.   Genitourinary:  Negative for dysuria, flank pain, frequency, hematuria and urgency.   Musculoskeletal:  Negative for back pain, joint pain, myalgias and neck pain.   Skin:  Negative for itching and rash.   Neurological:  Positive for tingling. Negative for dizziness, tremors (Transient, 1 month ago over the tongue and right leg. Spontaneous resolution.), sensory change, speech change, focal weakness, seizures, loss of consciousness, weakness and headaches (Brief intermittent headaches).   Endo/Heme/Allergies:  Does not bruise/bleed easily.        Right arm with ecchymosis after IV acces/needle insertion but one of them was spontaneous.    Psychiatric/Behavioral: Negative.        Physical Exam  Temp:  [36.2 °C (97.1 °F)-36.7 °C (98.1 °F)] 36.2 °C (97.1 °F)  Pulse:  [72-99] 72  Resp:  [16-18] 16  BP: (114-135)/(64-89) 125/89  SpO2:  [92 %-96 %] 96 %    Physical Exam:  General: Awake, alert and oriented.  Head and Neck: NC/AT, EOMI, no scleral icterus or conjunctival pallor.   CV: Rhythmic heart sounds, no murmurs, gallops or rubs. No S3,S4 or JVD.   Pulm: Chest expansion is symmetrical, no crackles, rales, rhonchi, or wheezing.  GI: Flat, non distended, soft, nontender, normal bowel sounds.  Skin: Warm, no rashes, ecchymosis over right arm x3 up to 5cm.   MSK: Normal ROM, no joint swelling. No lower extremity edema. No pain, no lesions.  Neuro: Patient is alert and oriented x3. Speech is clear and fluent. Is able to name, repeat and comprehend. Pupils equal, round and reactive to light. Extra ocular movements intact. Facial and body symmetry. No focal/sensory deficits  Psych: Appropriate mood and affect.       Fluids    Intake/Output Summary (Last 24 hours) at 2/16/2023 1759  Last data filed at 2/16/2023 0800  Gross per 24 hour   Intake 480 ml   Output --   Net 480 ml         Laboratory  Recent Labs     02/14/23  0519 02/15/23  0212 02/16/23  0141   WBC 9.3 9.1 8.2   RBC 3.39* 3.49* 3.81*   HEMOGLOBIN 10.4* 10.8* 11.9*   HEMATOCRIT 31.4* 32.3* 36.0*   MCV 92.6 92.6 94.5   MCH 30.7 30.9 31.2   MCHC 33.1* 33.4* 33.1*   RDW 50.2* 48.8 50.2*   PLATELETCT 23* 28* 49*   MPV 13.5* 12.6 12.8       Recent Labs     02/14/23 0519   GLUCOSE 100*       Recent Labs     02/15/23  0212   INR 0.99                 Imaging  CT-CHEST,ABDOMEN,PELVIS WITH   Final Result      1.  No evidence of lymphadenopathy in the chest, abdomen, or pelvis.   2.  No evidence of splenomegaly.   3.  No acute inflammatory process.      CT-HEAD W/O   Final Result      Head CT without contrast within normal limits. No evidence of acute cerebral infarction, hemorrhage or mass lesion.                Assessment/Plan  Problem Representation:    * Severe thrombocytopenia (HCC)- (present on admission)  Assessment & Plan  Unknown baseline platelet count, subacute, presenting with oral and extremities petechia 1 month ago.  Mild anemia but no signs of hemolysis (nl bili and LDH) and coagulation times within normal limits. No history of easy bruising/bleeding/clotting events.  No exposure to new medications or heparin. Recent hospitalization at [facility] 01/22/23 for 2 weeks for severe thrombocytopenia with platelets of 2.  There she received Eltrombopag, IVIG, Decadron, and was discharged 1 week ago on no therapy. Viral hepatitis panel and HIV negative.  Continue work-up, possible ITP. Patient is hemodynamically stable, no ongoing bleeding. CT chest/abdomen/pelvis unremarkable.  FRANCESCA not detectable.  Heme/onc following, appreciate recommendations.   -Switched Solu-Medrol to prednisone 1 mg/kg (begun on 2/13) for 3 weeks followed by taper   -will give additional  IVIG if drop in platelets recurs, last dose 2/12  -Platelet improving 28 > 49 this AM  -bone marrow biopsy on 2/15  -Begun on SSI while on high-dose steroids on 2/13, will monitor for continued need  -consider Bactrim for PJP prophylaxis upon discharge  -We will likely need outpatient DEXA scan due to necessary long-term use of steroids  -Continue to monitor    Headache  Assessment & Plan  Patient had brief and mild occipital headache on 2/13 which resolved, and no FND's were noted.  Recurrent mild occipital headache noted on 2/15 morning which transition to mild right frontal headache.  Again, no FND's noted with patient ANO x4.  Of note CT head on 2/11 unremarkable.  Low concern for intracranial bleed  -Continue to monitor  -We will obtain stat CT head if FND's/AMS occurs    Hypertension  Assessment & Plan  Controlled since admission.  -Continue to monitor         VTE prophylaxis: SCDs/TEDs    I have performed a physical exam and reviewed and updated ROS and Plan today (2/16/2023). In review of yesterday's note (2/15/2023), there are no changes except as documented above.

## 2023-02-17 NOTE — DISCHARGE SUMMARY
"[facility] Discharge Summary    Attending: Jesusita Trinidad M.d.  Senior Resident: Dr. Case  Intern:  Dr. Lieberman  Contact Number: [phone]    CHIEF COMPLAINT ON ADMISSION  Chief Complaint   Patient presents with    Abnormal Labs     Sent for low platelets \"12\"       Reason for Admission  Severe Thombocytopenia    Admission Date  2/11/2023    CODE STATUS  Full Code    HPI & HOSPITAL COURSE  Virgen Boone is a 68 yo female w/ past medical history of hypertension and hyperlipidemia who presented 2/11/2023 for thrombocytopenia likely secondary to ITP.    * Severe thrombocytopenia (HCC)- (present on admission)  Assessment & Plan  Unknown baseline platelet count, subacute, presented with oral and extremities petechia 1 month ago.  Mild anemia but no signs of hemolysis (nl bili and LDH) and coagulation studies within normal limits. No history of easy bruising/bleeding/clotting events.  No exposure to new medications or heparin. Recent hospitalization at [facility] 01/22/23 for 2 weeks for severe thrombocytopenia with platelets of 2.  There she received Eltrombopag, IVIG, Decadron, and was discharged 1 week ago on no therapy. Viral hepatitis panel and HIV negative. Patient was hemodynamically stable, no ongoing bleeding. CT chest/abdomen/pelvis unremarkaable.  FRANCESCA not detectable.  Heme/onc was consulted. Initially was given Solu-Medrol, which was switched to prednisone 1 mg/kg (begun on 2/13). IVIG also given with last dose 2/12/23. Platelets improved from 11 on admission to 49 on 2/16/23.  Bone marrow biopsy from 2/15 demonstrated normocellular marrow with trilineage hematopoiesis, no dysplasia or increased blasts noted.  Likely severe thrombocytopenia secondary to ITP.  Patient was set up with hematology oncology outpatient follow-up on 2/21/23, and prescribed 70 mg oral prednisone daily, will be tapered by outpatient heme/onc.  Patient did not utilize SSI during hospital stay, therefore no insulin needed " while on steroids.  Lastly, patient prescribed PPI for GI prophylaxis while on high-dose oral steroids.  Discharged in stable condition       Headache  Assessment & Plan  Patient had brief and mild occipital headache on 2/13 which resolved, and no FND's were noted.  Recurrent mild occipital headache noted on 2/15 morning which transition to mild right frontal headache.  Again, no FND's noted with patient A&O x4.  Of note CT head on 2/11 unremarkable.  Low concern for intracranial bleed.  No recurrent headache following, discharged in stable condition.     Hypertension  Assessment & Plan  Controlled since admission without use of medications.  Patient has history of hypertension and takes HCTZ at home, however is not required it during her inpatient stay.  Was discharged with discontinuation of her home HCTZ, as patient does not need pharmacologic blood pressure control.       Therefore, she is discharged in good and stable condition to home with close outpatient follow-up.    The patient met 2-midnight criteria for an inpatient stay at the time of discharge.    Discharge Date  2/17/23    Physical Exam on Day of Discharge  General: Awake, alert and oriented.  Head and Neck: NC/AT, EOMI, no scleral icterus or conjunctival pallor.   CV: Rhythmic heart sounds, no murmurs, gallops or rubs. No S3,S4 or JVD.   Pulm: Chest expansion is symmetrical, no crackles, rales, rhonchi, or wheezing.  GI: Flat, non distended, soft, nontender, normal bowel sounds.  Skin: Warm, no rashes, ecchymosis over right arm x3 up to 5cm.   MSK: Normal ROM, no joint swelling. No lower extremity edema. No pain, no lesions.  Neuro: Patient is alert and oriented x3. Speech is clear and fluent. Is able to name, repeat and comprehend. Pupils equal, round and reactive to light. Extra ocular movements intact. Facial and body symmetry. No focal/sensory deficits  Psych: Appropriate mood and affect.      FOLLOW UP ITEMS POST DISCHARGE  -Advised to obtain  CBC on 2/20/23  -Encouraged to attend outpatient follow-up visit with hematology oncology specialist Dr. Streeter on 2/21/2023  -Educated on continuing prednisone 70 mg every morning, with taper to be decided by outpatient heme/onc  -Also prescribed PPI as GI prophylaxis while on steroids  -No need for HCTZ at home, his blood pressure has been well controlled without medical management during this admission    DISCHARGE DIAGNOSES  Principal Problem:    Severe thrombocytopenia (HCC) POA: Yes  Active Problems:    Hyperlipidemia POA: Unknown  Resolved Problems:    Hypertension POA: Unknown    Hypokalemia POA: Unknown    Headache POA: Unknown      FOLLOW UP  Has scheduled follow-up visit with Dr. Streeter (hematologist/oncologist) on 2/21/2023    MEDICATIONS ON DISCHARGE     Medication List        START taking these medications        Instructions   omeprazole 20 MG delayed-release capsule  Commonly known as: PRILOSEC   [place] 1 cápsula por vía oral diariamente.  (Take 1 Capsule by mouth every day.)  Dose: 20 mg     predniSONE 10 MG Tabs  Commonly known as: DELTASONE   Take 7 Tablets by mouth every morning for 14 days. Will be tapered by Heme/Onc outpatient  Dose: 70 mg            CONTINUE taking these medications        Instructions   atorvastatin 20 MG Tabs  Commonly known as: LIPITOR   Take 20 mg by mouth every evening.  Dose: 20 mg            STOP taking these medications      hydroCHLOROthiazide 25 MG Tabs  Commonly known as: HYDRODIURIL              Allergies  No Known Allergies    DIET  Orders Placed This Encounter   Procedures    Diet Order Diet: Regular     Standing Status:   Standing     Number of Occurrences:   1     Order Specific Question:   Diet:     Answer:   Regular [1]       ACTIVITY  As tolerated.  Weight bearing as tolerated    CONSULTATIONS  Hematology/Oncology    PROCEDURES  Bone Marrow Biopsy (2/14/23)    LABORATORY  Lab Results   Component Value Date    SODIUM 137 02/13/2023    POTASSIUM 3.7 02/13/2023     CHLORIDE 105 02/13/2023    CO2 22 02/13/2023    GLUCOSE 100 (H) 02/14/2023    BUN 19 02/13/2023    CREATININE 0.57 02/13/2023    GLOMRATE 66 02/05/2023        Lab Results   Component Value Date    WBC 8.2 02/16/2023    HEMOGLOBIN 11.9 (L) 02/16/2023    HEMATOCRIT 36.0 (L) 02/16/2023    PLATELETCT 49 (L) 02/16/2023        Total time of the discharge process exceeds 30 minutes.

## 2023-02-17 NOTE — CARE PLAN
The patient is Stable - Low risk of patient condition declining or worsening    Shift Goals  Clinical Goals: safety by prevention of falls and injuries  Patient Goals: go home  Family Goals: LOPEZ      Problem: Knowledge Deficit - Standard  Goal: Patient and family/care givers will demonstrate understanding of plan of care, disease process/condition, diagnostic tests and medications  Outcome: Progressing     Problem: Fall Risk  Goal: Patient will remain free from falls  Outcome: Progressing     Problem: Skin Integrity  Goal: Skin integrity is maintained or improved  Outcome: Progressing

## 2023-02-17 NOTE — PROGRESS NOTES
D/c instructions given, educated on worsening s/s. Pt understands and questions answered. Home with family

## 2023-02-17 NOTE — PROGRESS NOTES
Received bedside report from RN Joanna, pt care assumed. VS WDL, pt assessment complete. Pt A&Ox4, no c/o pain at this time. POC discussed with pt and verbalizes no questions. Pt denies any additional needs at this time. Bed locked and in lowest position, bed alarm off as client is up to self. Pt educated on fall risk and verbalized understanding, call light within reach, hourly rounding initiated.

## 2023-02-17 NOTE — PROGRESS NOTES
Patient received was transferred with the diagnosis of thrombocytopenia  care assumed. VSS .Patient assessment completed. Pt A&O 4 No complain of pain upon transfer POC discussed with pt and verbalizes no questions. Pt denies any additional needs at this time. Bed locked and in lowest position, bed alarm . Pt educated on fall risk and verbalized understanding, call light within reach, hourly rounding initiated. For possible discharge this morning

## 2023-02-17 NOTE — DISCHARGE INSTRUCTIONS
Thrombocytopenia  Thrombocytopenia means that you have a low number of platelets in your blood. Platelets are tiny cells in the blood. When you bleed, they clump together at the cut or injury to stop the bleeding. This is called blood clotting. If you do not have enough platelets, it can cause bleeding problems. Some cases of this condition are mild while others are more severe.  What are the causes?  This condition may be caused by:  Your body not making enough platelets. This may be caused by:  Your bone marrow not making blood cells (aplastic anemia).  Cancer in the bone marrow.  Certain medicines.  Infection in the bone marrow.  Drinking a lot of alcohol.  Your body destroying platelets too quickly. This may be caused by:  Certain immune diseases.  Certain medicines.  Certain blood clotting disorders.  Certain disorders that are passed from parent to child (inherited).  Certain bleeding disorders.  Pregnancy.  Having a spleen that is larger than normal.  What are the signs or symptoms?  Bleeding that is not normal.  Nosebleeds.  Heavy menstrual periods.  Blood in the pee (urine) or poop (stool).  A purple-like color to the skin (purpura).  Bruising.  A rash that looks like pinpoint, purple-red spots (petechiae).  How is this treated?  Treatment of another condition that is causing the low platelet count.  Medicines to help protect your platelets from being destroyed.  A replacement (transfusion) of platelets to stop or prevent bleeding.  Surgery to remove the spleen.  Follow these instructions at home:  Activity  Avoid activities that could cause you to get hurt or bruised. Follow instructions about how to prevent falls.  Take care not to cut yourself:  When you shave.  When you use scissors, needles, knives, or other tools.  Take care not to burn yourself:  When you use an iron.  When you cook.  General instructions    Check your skin and the inside of your mouth for bruises or blood as told by your  doctor.  Check to see if there is blood in your spit (sputum), pee, and poop. Do this as told by your doctor.  Do not drink alcohol.  Take over-the-counter and prescription medicines only as told by your doctor.  Do not take any medicines that have aspirin or NSAIDs in them. These medicines can thin your blood and cause you to bleed.  Tell all of your doctors that you have this condition. Be sure to tell your dentist and eye doctor too.  Contact a doctor if:  You have bruises and you do not know why.  Get help right away if:  You are bleeding anywhere on your body.  You have blood in your spit, pee, or poop.  Summary  Thrombocytopenia means that you have a low number of platelets in your blood.  Platelets are needed for blood clotting.  Symptoms of this condition include bleeding that is not normal, and bruising.  Take care not to cut or burn yourself.  This information is not intended to replace advice given to you by your health care provider. Make sure you discuss any questions you have with your health care provider.  Document Released: 12/06/2012 Document Revised: 09/19/2019 Document Reviewed: 09/19/2019  Elsevier Patient Education © 2020 [employer]

## 2023-02-17 NOTE — CARE PLAN
"The patient is Stable - Low risk of patient condition declining or worsening    Shift Goals  Clinical Goals: Patient will verbalize POC.  Patient Goals: \"Go home tomorrow.\"  Family Goals: LOPEZ    Progress made toward(s) clinical / shift goals:  on going    Patient is progressing towards the following goals:possible discharge tomorrow      "

## (undated) DEVICE — SPONGE GAUZESTER 4 X 4 4PLY - (128PK/CA)

## (undated) DEVICE — SYRINGE 3 CC 22 GA X 1-1/2 - NDL SAFETY (50/BX 8BX/CA) DELETE AND POINT TO PREMIER 65230

## (undated) DEVICE — TOWEL STOP TIMEOUT SAFETY FLAG (40EA/CA)

## (undated) DEVICE — SET LEADWIRE 5 LEAD BEDSIDE DISPOSABLE ECG (1SET OF 5/EA)

## (undated) DEVICE — CUSHION EAR E-Z WRAP NASAL CANNULA - (25/CA)

## (undated) DEVICE — BANDAID SHEER STRIP 3/4 IN (100EA/BX 12BX/CA)

## (undated) DEVICE — SENSOR OXIMETER ADULT SPO2 RD SET (20EA/BX)

## (undated) DEVICE — SYRINGE NON SAFETY 5 CC 20 GA X 1-1/2 IN (100/BX 4BX/CA)

## (undated) DEVICE — SOD. CHL 10CC SYRINGE PREFILL - W/10 CC (30/BX)